# Patient Record
Sex: FEMALE | Race: WHITE | Employment: FULL TIME | URBAN - METROPOLITAN AREA
[De-identification: names, ages, dates, MRNs, and addresses within clinical notes are randomized per-mention and may not be internally consistent; named-entity substitution may affect disease eponyms.]

---

## 2018-01-29 ENCOUNTER — TRANSFERRED RECORDS (OUTPATIENT)
Dept: HEALTH INFORMATION MANAGEMENT | Facility: CLINIC | Age: 23
End: 2018-01-29

## 2018-02-02 ENCOUNTER — OFFICE VISIT (OUTPATIENT)
Dept: DERMATOLOGY | Facility: CLINIC | Age: 23
End: 2018-02-02
Payer: COMMERCIAL

## 2018-02-02 DIAGNOSIS — D22.9 MULTIPLE BENIGN NEVI: Primary | ICD-10-CM

## 2018-02-02 DIAGNOSIS — Z12.83 SKIN CANCER SCREENING: ICD-10-CM

## 2018-02-02 RX ORDER — ALBUTEROL SULFATE 90 UG/1
2 AEROSOL, METERED RESPIRATORY (INHALATION)
COMMUNITY
Start: 2014-05-22

## 2018-02-02 ASSESSMENT — PAIN SCALES - GENERAL: PAINLEVEL: NO PAIN (0)

## 2018-02-02 NOTE — MR AVS SNAPSHOT
After Visit Summary   2018    Gabriela Mcmahon    MRN: [id number]           Patient Information     Date Of Birth          1995        Visit Information        Provider Department      2018 1:45 PM Vicki Urban PA-C [facility]        Today's Diagnoses     Multiple benign nevi    -  1    Skin cancer screening           Follow-ups after your visit        Follow-up notes from your care team     Return in about 1 year (around 2019).      Who to contact     Please call your clinic at [phone] to:    Ask questions about your health    Make or cancel appointments    Discuss your medicines    Learn about your test results    Speak to your doctor   If you have compliments or concerns about an experience at your clinic, or if you wish to file a complaint, please contact [facility] Patient Relations at [phone] or email us at [email or web address]         Additional Information About Your Visit        MyChart Information     Phosphagenicst is an electronic gateway that provides easy, online access to your medical records. With Inkomerce, you can request a clinic appointment, read your test results, renew a prescription or communicate with your care team.     To sign up for Phosphagenicst visit the website at [email or web address]   You will be asked to enter the access code listed below, as well as some personal information. Please follow the directions to create your username and password.     Your access code is: [email or web address]  Expires: 5/3/2018  6:31 AM     Your access code will  in 90 days. If you need help or a new code, please contact your [facility] or call [phone] for assistance.        Care EveryWhere ID     This is your Care EveryWhere ID. This could be used by other organizations to access your [facility] medical records  [id number]         Blood Pressure from Last 3 Encounters:   No data found  for BP    Weight from Last 3 Encounters:   No data found for Wt              Today, you had the following     No orders found for display       Primary Care Provider    None Specified       No primary provider on file.        Equal Access to Services     XANDER SIERRA : Rocio Saini, jane red, porter gregoryesthela denisehai, tyler kevinin hayaan denisemary lou horan duarte rizo. So [facility] [phone].    ATENCIÓN: Si habla español, tiene a thomas disposición servicios gratuitos de asistencia lingüística. Llame al [phone].    We comply with applicable federal civil rights laws and [place] laws. We do not discriminate on the basis of race, color, national origin, age, disability, sex, sexual orientation, or gender identity.            Thank you!     Thank you for choosing [facility] DERMATOLOGY  for your care. Our goal is always to provide you with excellent care. Hearing back from our patients is one way we can continue to improve our services. Please take a few minutes to complete the written survey that you may receive in the mail after your visit with us. Thank you!             Your Updated Medication List - Protect others around you: Learn how to safely use, store and throw away your medicines at www.disposemymeds.org.          This list is accurate as of 2/2/18  2:30 PM.  Always use your most recent med list.                   Brand Name Dispense Instructions for use Diagnosis    albuterol 108 (90 BASE) MCG/ACT Inhaler    PROAIR HFA/PROVENTIL HFA/VENTOLIN HFA     Inhale 2 puffs into the lungs        MIRENA (52 MG) IU

## 2018-02-02 NOTE — NURSING NOTE
Dermatology Rooming Note    Gabriela Mcmahon's goals for this visit include:   Chief Complaint   Patient presents with     Skin Check     Gabriela is here for a skin check- had some lesions removed, notes no areas of concern.      Yana Phoenix MA

## 2018-02-02 NOTE — LETTER
2/2/2018       RE: Gabriela Mcmahon  [address] [place] [facility] [address]     Dear Colleague,    Thank you for referring your patient, Gabriela Mcmahon, to the [facility] DERMATOLOGY at [facility]. Please see a copy of my visit note below.    [facility] Dermatology Note      Dermatology Problem List:  1.Hx moderately dysplastic nevus on back, s/p ED&C with PCP- 1/15/2018    CC:   Chief Complaint   Patient presents with     Skin Check     Gabriela is here for a skin check- had some lesions removed, notes no areas of concern.          Encounter Date: Feb 2, 2018    History of Present Illness:  Ms. Gabriela Mcmahon is a 23 year old female who is new to the dermatology department. She presents for a full skin check. She was recently seen by her PCP who biopsied a nevus on her back which was found to be moderately dysplastic. It was then removed by scraping about 2 weeks ago she says. Her primary also recently biopsied a lesion on her L anterior knee, but the pathology has not come back yet. Her PCP recommended she establish care with dermatology. Gabriela has no specific lesions of concern today. She uses SPF 55 on a regular basis, and states she burns easily. She is otherwise healthy. She reports a distant family hx of skin cancer (grandparents), but is not sure of what type.    She uses an albuterol inhaler prn for exercise induced asthma and has a Mirena IUD.    Past Medical History:   There is no problem list on file for this patient.    History reviewed. No pertinent past medical history.  History reviewed. No pertinent surgical history.    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 Originally from [place]. The patient denies use of tanning beds.    Family History:  There is a distant family history of skin cancer (grandparents), but patient is not sure what  kind.    Medications:  Current Outpatient Prescriptions   Medication Sig Dispense Refill     albuterol (PROAIR HFA/PROVENTIL HFA/VENTOLIN HFA) 108 (90 BASE) MCG/ACT Inhaler Inhale 2 puffs into the lungs       Levonorgestrel (MIRENA, 52 MG, IU)        Allergies   Allergen Reactions     Amoxicillin Rash         Review of Systems:  -As per HPI  -Constitutional: The patient denies fatigue, fevers, chills, unintended weight loss, and night sweats.  -HEENT: Patient denies nonhealing oral sores.  -Skin: As above in HPI. No additional skin concerns.    Physical exam:  Vitals: There were no vitals taken for this visit.  GEN: This is a well developed, well-nourished female in no acute distress, in a pleasant mood.    SKIN: Full skin, which includes the head/face, both arms, chest, back, abdomen, both legs, genitalia and/or groin buttocks, digits and/or nails, was examined.  -Multiple regular brown pigmented macules and papules are identified on the torso, arms and legs.   -There is a 2x2cm erythematous healing plaque on the left mid upper back s/p dysplastic nevi removal by PCP, no sign of infection, purulence, not tender on palpation.   -No other lesions of concern on areas examined.     Impression/Plan:  1. Multiple clinically benign nevi on the trunk, arms and legs.    ABCDs of melanoma were discussed and self skin checks were advised. , Sun precaution was advised including the use of sun screens of SPF 30 or higher, sun protective clothing, and avoidance of tanning beds., We will clinically monitor this area.    I have requested the patient's recent pathology reports from her PCP so that her record can be fully updated as she is transferring care here.    Specifically the most recent biopsy of the R anterior knee as the pathology has not come back yet per patient. Pending path results, we may or may not recommend further treatment to the site.    2. Skin Screening    ABCDs of melanoma were discussed and self skin checks  were advised. Sun precaution was advised including the use of sun screens of SPF 30 or higher, sun protective clothing, and avoidance of tanning beds. We will clinically monitor this area.    CC Dr. Lobo on close of this encounter.    Follow-up in 1 year, or sooner pending pathology report from PCP or new/changing lesions.      Staff Involved:  Staff Only    Vicki Urban PA-C  Department of Dermatology  [facility]  Phone: [phone]

## 2018-02-02 NOTE — PROGRESS NOTES
[facility] Dermatology Note      Dermatology Problem List:  1.Hx moderately dysplastic nevus on back, s/p ED&C with PCP- 1/15/2018    CC:   Chief Complaint   Patient presents with     Skin Check     Gabriela is here for a skin check- had some lesions removed, notes no areas of concern.          Encounter Date: Feb 2, 2018    History of Present Illness:  Ms. Gabriela Mcmahon is a 23 year old female who is new to the dermatology department. She presents for a full skin check. She was recently seen by her PCP who biopsied a nevus on her back which was found to be moderately dysplastic. It was then removed by scraping about 2 weeks ago she says. Her primary also recently biopsied a lesion on her L anterior knee, but the pathology has not come back yet. Her PCP recommended she establish care with dermatology. Gabriela has no specific lesions of concern today. She uses SPF 55 on a regular basis, and states she burns easily. She is otherwise healthy. She reports a distant family hx of skin cancer (grandparents), but is not sure of what type.    She uses an albuterol inhaler prn for exercise induced asthma and has a Mirena IUD.    Past Medical History:   There is no problem list on file for this patient.    History reviewed. No pertinent past medical history.  History reviewed. No pertinent surgical history.    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 Originally from [place]. The patient denies use of tanning beds.    Family History:  There is a distant family history of skin cancer (grandparents), but patient is not sure what kind.    Medications:  Current Outpatient Prescriptions   Medication Sig Dispense Refill     albuterol (PROAIR HFA/PROVENTIL HFA/VENTOLIN HFA) 108 (90 BASE) MCG/ACT Inhaler Inhale 2 puffs into the lungs       Levonorgestrel (MIRENA, 52 MG, IU)        Allergies   Allergen Reactions      Amoxicillin Rash         Review of Systems:  -As per HPI  -Constitutional: The patient denies fatigue, fevers, chills, unintended weight loss, and night sweats.  -HEENT: Patient denies nonhealing oral sores.  -Skin: As above in HPI. No additional skin concerns.    Physical exam:  Vitals: There were no vitals taken for this visit.  GEN: This is a well developed, well-nourished female in no acute distress, in a pleasant mood.    SKIN: Full skin, which includes the head/face, both arms, chest, back, abdomen, both legs, genitalia and/or groin buttocks, digits and/or nails, was examined.  -Multiple regular brown pigmented macules and papules are identified on the torso, arms and legs.   -There is a 2x2cm erythematous healing plaque on the left mid upper back s/p dysplastic nevi removal by PCP, no sign of infection, purulence, not tender on palpation.   -No other lesions of concern on areas examined.     Impression/Plan:  1. Multiple clinically benign nevi on the trunk, arms and legs.    ABCDs of melanoma were discussed and self skin checks were advised. , Sun precaution was advised including the use of sun screens of SPF 30 or higher, sun protective clothing, and avoidance of tanning beds., We will clinically monitor this area.    I have requested the patient's recent pathology reports from her PCP so that her record can be fully updated as she is transferring care here.    Specifically the most recent biopsy of the R anterior knee as the pathology has not come back yet per patient. Pending path results, we may or may not recommend further treatment to the site.    2. Skin Screening    ABCDs of melanoma were discussed and self skin checks were advised. Sun precaution was advised including the use of sun screens of SPF 30 or higher, sun protective clothing, and avoidance of tanning beds. We will clinically monitor this area.    CC Dr. Lobo on close of this encounter.    Follow-up in 1 year, or sooner pending pathology  report from PCP or new/changing lesions.      Staff Involved:  Staff Only    Vicki Urban PA-C  Department of Dermatology  [facility]  Phone: [phone]

## 2018-02-09 ENCOUNTER — APPOINTMENT (OUTPATIENT)
Dept: CT IMAGING | Facility: CLINIC | Age: 23
End: 2018-02-09
Attending: EMERGENCY MEDICINE
Payer: COMMERCIAL

## 2018-02-09 ENCOUNTER — HOSPITAL ENCOUNTER (OUTPATIENT)
Facility: CLINIC | Age: 23
Setting detail: OBSERVATION
Discharge: HOME | End: 2018-02-10
Attending: EMERGENCY MEDICINE | Admitting: EMERGENCY MEDICINE
Payer: COMMERCIAL

## 2018-02-09 ENCOUNTER — NURSE TRIAGE (OUTPATIENT)
Dept: NURSING | Facility: CLINIC | Age: 23
End: 2018-02-09

## 2018-02-09 DIAGNOSIS — R10.84 ABDOMINAL PAIN, GENERALIZED: ICD-10-CM

## 2018-02-09 DIAGNOSIS — R11.2 NON-INTRACTABLE VOMITING WITH NAUSEA, UNSPECIFIED VOMITING TYPE: ICD-10-CM

## 2018-02-09 DIAGNOSIS — N83.8 OVARIAN ENLARGEMENT, RIGHT: ICD-10-CM

## 2018-02-09 LAB
ALBUMIN SERPL-MCNC: 3.9 G/DL (ref 3.4–5)
ALBUMIN UR-MCNC: 10 MG/DL
ALP SERPL-CCNC: 66 U/L (ref 40–150)
ALT SERPL W P-5'-P-CCNC: 12 U/L (ref 0–50)
ANION GAP SERPL CALCULATED.3IONS-SCNC: 6 MMOL/L (ref 3–14)
APPEARANCE UR: ABNORMAL
AST SERPL W P-5'-P-CCNC: 17 U/L (ref 0–45)
BACTERIA #/AREA URNS HPF: ABNORMAL /HPF
BASOPHILS # BLD AUTO: 0 10E9/L (ref 0–0.2)
BASOPHILS NFR BLD AUTO: 0.2 %
BILIRUB SERPL-MCNC: 0.8 MG/DL (ref 0.2–1.3)
BILIRUB UR QL STRIP: NEGATIVE
BUN SERPL-MCNC: 13 MG/DL (ref 7–30)
CALCIUM SERPL-MCNC: 8.8 MG/DL (ref 8.5–10.1)
CHLORIDE SERPL-SCNC: 105 MMOL/L (ref 94–109)
CO2 SERPL-SCNC: 25 MMOL/L (ref 20–32)
COLOR UR AUTO: YELLOW
CREAT BLD-MCNC: 0.8 MG/DL (ref 0.52–1.04)
CREAT SERPL-MCNC: 0.73 MG/DL (ref 0.52–1.04)
DIFFERENTIAL METHOD BLD: ABNORMAL
EOSINOPHIL # BLD AUTO: 0.2 10E9/L (ref 0–0.7)
EOSINOPHIL NFR BLD AUTO: 4.5 %
ERYTHROCYTE [DISTWIDTH] IN BLOOD BY AUTOMATED COUNT: 12 % (ref 10–15)
GFR SERPL CREATININE-BSD FRML MDRD: 90 ML/MIN/1.7M2
GFR SERPL CREATININE-BSD FRML MDRD: >90 ML/MIN/1.7M2
GLUCOSE SERPL-MCNC: 87 MG/DL (ref 70–99)
GLUCOSE UR STRIP-MCNC: NEGATIVE MG/DL
HCG UR QL: NEGATIVE
HCT VFR BLD AUTO: 43.3 % (ref 35–47)
HGB BLD-MCNC: 14.4 G/DL (ref 11.7–15.7)
HGB UR QL STRIP: NEGATIVE
IMM GRANULOCYTES # BLD: 0 10E9/L (ref 0–0.4)
IMM GRANULOCYTES NFR BLD: 0.2 %
INTERNAL QC OK POCT: YES
KETONES UR STRIP-MCNC: 40 MG/DL
LEUKOCYTE ESTERASE UR QL STRIP: NEGATIVE
LIPASE SERPL-CCNC: 132 U/L (ref 73–393)
LYMPHOCYTES # BLD AUTO: 0.9 10E9/L (ref 0.8–5.3)
LYMPHOCYTES NFR BLD AUTO: 21.5 %
MCH RBC QN AUTO: 31.1 PG (ref 26.5–33)
MCHC RBC AUTO-ENTMCNC: 33.3 G/DL (ref 31.5–36.5)
MCV RBC AUTO: 94 FL (ref 78–100)
MONOCYTES # BLD AUTO: 0.4 10E9/L (ref 0–1.3)
MONOCYTES NFR BLD AUTO: 9.1 %
MUCOUS THREADS #/AREA URNS LPF: PRESENT /LPF
NEUTROPHILS # BLD AUTO: 2.7 10E9/L (ref 1.6–8.3)
NEUTROPHILS NFR BLD AUTO: 64.5 %
NITRATE UR QL: NEGATIVE
NRBC # BLD AUTO: 0 10*3/UL
NRBC BLD AUTO-RTO: 0 /100
PH UR STRIP: 5.5 PH (ref 5–7)
PLATELET # BLD AUTO: 147 10E9/L (ref 150–450)
POTASSIUM SERPL-SCNC: 3.4 MMOL/L (ref 3.4–5.3)
PROT SERPL-MCNC: 7.5 G/DL (ref 6.8–8.8)
RBC # BLD AUTO: 4.63 10E12/L (ref 3.8–5.2)
RBC #/AREA URNS AUTO: <1 /HPF (ref 0–2)
SODIUM SERPL-SCNC: 137 MMOL/L (ref 133–144)
SOURCE: ABNORMAL
SP GR UR STRIP: 1.02 (ref 1–1.03)
SQUAMOUS #/AREA URNS AUTO: 7 /HPF (ref 0–1)
TRANS CELLS #/AREA URNS HPF: <1 /HPF (ref 0–1)
UROBILINOGEN UR STRIP-MCNC: NORMAL MG/DL (ref 0–2)
WBC # BLD AUTO: 4.2 10E9/L (ref 4–11)
WBC #/AREA URNS AUTO: 1 /HPF (ref 0–2)

## 2018-02-09 PROCEDURE — 83690 ASSAY OF LIPASE: CPT | Performed by: EMERGENCY MEDICINE

## 2018-02-09 PROCEDURE — 96361 HYDRATE IV INFUSION ADD-ON: CPT | Mod: 59

## 2018-02-09 PROCEDURE — 81025 URINE PREGNANCY TEST: CPT | Performed by: EMERGENCY MEDICINE

## 2018-02-09 PROCEDURE — 25000128 H RX IP 250 OP 636: Performed by: EMERGENCY MEDICINE

## 2018-02-09 PROCEDURE — 99285 EMERGENCY DEPT VISIT HI MDM: CPT | Mod: Z6 | Performed by: EMERGENCY MEDICINE

## 2018-02-09 PROCEDURE — 80053 COMPREHEN METABOLIC PANEL: CPT | Performed by: EMERGENCY MEDICINE

## 2018-02-09 PROCEDURE — 74177 CT ABD & PELVIS W/CONTRAST: CPT

## 2018-02-09 PROCEDURE — 85025 COMPLETE CBC W/AUTO DIFF WBC: CPT | Performed by: EMERGENCY MEDICINE

## 2018-02-09 PROCEDURE — 82565 ASSAY OF CREATININE: CPT

## 2018-02-09 PROCEDURE — 96374 THER/PROPH/DIAG INJ IV PUSH: CPT

## 2018-02-09 PROCEDURE — 81001 URINALYSIS AUTO W/SCOPE: CPT | Performed by: EMERGENCY MEDICINE

## 2018-02-09 PROCEDURE — 99285 EMERGENCY DEPT VISIT HI MDM: CPT | Mod: 25

## 2018-02-09 RX ORDER — IOPAMIDOL 755 MG/ML
77 INJECTION, SOLUTION INTRAVASCULAR ONCE
Status: COMPLETED | OUTPATIENT
Start: 2018-02-09 | End: 2018-02-09

## 2018-02-09 RX ORDER — ONDANSETRON 2 MG/ML
4 INJECTION INTRAMUSCULAR; INTRAVENOUS EVERY 30 MIN PRN
Status: DISCONTINUED | OUTPATIENT
Start: 2018-02-09 | End: 2018-02-10

## 2018-02-09 RX ADMIN — ONDANSETRON 4 MG: 2 INJECTION INTRAMUSCULAR; INTRAVENOUS at 20:49

## 2018-02-09 RX ADMIN — SODIUM CHLORIDE, POTASSIUM CHLORIDE, SODIUM LACTATE AND CALCIUM CHLORIDE 1000 ML: 600; 310; 30; 20 INJECTION, SOLUTION INTRAVENOUS at 20:49

## 2018-02-09 RX ADMIN — IOPAMIDOL 77 ML: 755 INJECTION, SOLUTION INTRAVENOUS at 22:00

## 2018-02-09 ASSESSMENT — ENCOUNTER SYMPTOMS
POLYDIPSIA: 0
BACK PAIN: 0
NECK PAIN: 0
VOMITING: 1
LIGHT-HEADEDNESS: 0
COLOR CHANGE: 0
NECK STIFFNESS: 0
NAUSEA: 1
BRUISES/BLEEDS EASILY: 0
AGITATION: 0
SHORTNESS OF BREATH: 0
CHILLS: 0
FEVER: 0
ABDOMINAL PAIN: 1
DIFFICULTY URINATING: 0
ADENOPATHY: 0

## 2018-02-09 NOTE — IP AVS SNAPSHOT
MRN:[id number]                      After Visit Summary   2/9/2018    Gabriela Mcmahon    MRN: [id number]           Thank you!     Thank you for choosing [facility] for your care. Our goal is always to provide you with excellent care. Hearing back from our patients is one way we can continue to improve our services. Please take a few minutes to complete the written survey that you may receive in the mail after you visit with us. Thank you!        Patient Information     Date Of Birth          1995        Designated Caregiver       Most Recent Value    Caregiver    Will someone help with your care after discharge? yes    Name of designated caregiver Dany     Phone number of caregiver [phone]    Caregiver address [place]       About your hospital stay     You were admitted on:  February 10, 2018 You last received care in the:  [address] [facility]    You were discharged on:  February 10, 2018        Reason for your hospital stay       You were admitted with            Reason for your hospital stay       You were admitted with nausea and vomiting. Your symptoms resolved. You felt better and can discharge to home.     We did a CT which showed your right ovary is larger than the left. OB saw you and did not think this was anything to be concerned about at this time. They recommended to follow-up in 1 month and return to the ED with pain to the right lower abdomen of fevers.                  Who to Call     For medical emergencies, please call 911.  For non-urgent questions about your medical care, please call your primary care provider or clinic, None          Attending Provider     Provider Specialty    Yvette Pacheco MD Emergency Medicine    Taylor Alex MD Emergency Medicine    Joyce, Meena Patiño MD Emergency Medicine       Primary Care Provider Fax #    [facility] [employer] [phone]       When to contact your care team       Return to the ED with fever,  uncontrolled nausea, vomiting, unrelieved pain, bleeding not relieved with pressure, dizziness, chest pain, shortness of breath, loss of consciousness, and any new or concerning symptoms.                  After Care Instructions     Activity       Your activity upon discharge: activity as tolerated and no driving for today            Diet       Follow this diet upon discharge: Orders Placed This Encounter      Advance Diet as Tolerated: Regular Diet Adult                  Follow-up Appointments     Adult [facility] Follow-up and recommended labs and tests       Follow up with primary care provider, [facility], within 7 days for hospital follow- up.  The following labs/tests are recommended: CBC.    OB/GYN within 1 month       Appointments on [place] and/or [facility] (with [facility] provider or service). Call [phone] if you haven't heard regarding these appointments within 7 days of discharge.                  Additional Services     OB/GYN REFERRAL       Your provider has referred you to:  PREFERRED PROVIDERS:    Please be aware that coverage of these services is subject to the terms and limitations of your health insurance plan.  Call member services at your health plan with any benefit or coverage questions.      Please bring the following with you to your appointment:    (1) Any X-Rays, CTs or MRIs which have been performed.  Contact the facility where they were done to arrange for  prior to your scheduled appointment.   (2) List of current medications   (3) This referral request   (4) Any documents/labs given to you for this referral                  Pending Results     No orders found for last 3 day(s).            Statement of Approval     Ordered          02/10/18 1608  I have reviewed and agree with all the recommendations and orders detailed in this document.  EFFECTIVE NOW     Approved and electronically signed by:  Maura Resendez APRN CNP             Admission  "Information     Date & Time Provider Department Dept. Phone    2018 Meena Cook MD [address] [facility] [place] [phone]      Your Vitals Were     Blood Pressure Pulse Temperature Respirations Height Weight    97/58 (BP Location: Left arm) 69 98.4  F (36.9  C) (Oral) 18 1.626 m (5' 4\") 56.6 kg (124 lb 12.8 oz)    Pulse Oximetry BMI (Body Mass Index)                98% 21.42 kg/m2          [employer] Information     EasyLink lets you send messages to your doctor, view your test results, renew your prescriptions, schedule appointments and more. To sign up, go to [email or web address] . Click on \"Log in\" on the left side of the screen, which will take you to the Welcome page. Then click on \"Sign up Now\" on the right side of the page.     You will be asked to enter the access code listed below, as well as some personal information. Please follow the directions to create your username and password.     Your access code is: [email or web address]  Expires: 5/3/2018  6:31 AM     Your access code will  in 90 days. If you need help or a new code, please call your [facility] clinic or [phone].        Care EveryWhere ID     This is your Care EveryWhere ID. This could be used by other organizations to access your [facility] medical records  [id number]        Equal Access to Services     XANDER SIERRA AH: Hadii efra Saini, waaxda luqadaha, qaybta kaalmada crisyadelvis, tyler rizo. So [facility] [phone].    ATENCIÓN: Si habla español, tiene a thomas disposición servicios gratuitos de asistencia lingüística. Llame al [phone].    We comply with applicable federal civil rights laws and [place] laws. We do not discriminate on the basis of race, color, national origin, age, disability, sex, sexual orientation, or gender identity.               Review of your medicines      START taking        Dose / Directions    ondansetron 4 MG ODT tab   Commonly known as:  ZOFRAN-ODT   Used for:  " Non-intractable vomiting with nausea, unspecified vomiting type        Dose:  4 mg   Take 1 tablet (4 mg) by mouth every 6 hours as needed for nausea or vomiting   Quantity:  10 tablet   Refills:  0         CONTINUE these medicines which have NOT CHANGED        Dose / Directions    albuterol 108 (90 BASE) MCG/ACT Inhaler   Commonly known as:  PROAIR HFA/PROVENTIL HFA/VENTOLIN HFA        Dose:  2 puff   Inhale 2 puffs into the lungs   Refills:  0       MIRENA (52 MG) IU        Refills:  0            Where to get your medicines      These medications were sent to [facility] - [place], [place] - [address]  [address] [address]     Phone:  [phone]     ondansetron 4 MG ODT tab                Protect others around you: Learn how to safely use, store and throw away your medicines at www.disposemymeds.org.             Medication List: This is a list of all your medications and when to take them. Check marks below indicate your daily home schedule. Keep this list as a reference.      Medications           Morning Afternoon Evening Bedtime As Needed    albuterol 108 (90 BASE) MCG/ACT Inhaler   Commonly known as:  PROAIR HFA/PROVENTIL HFA/VENTOLIN HFA   Inhale 2 puffs into the lungs                                MIRENA (52 MG) IU                                ondansetron 4 MG ODT tab   Commonly known as:  ZOFRAN-ODT   Take 1 tablet (4 mg) by mouth every 6 hours as needed for nausea or vomiting                                          More Information        Self-Care for Vomiting and Diarrhea    Vomiting and diarrhea can make you miserable. Your stomach and bowels are reacting to an irritant. This might be food, medicine, or viral stomach flu. Vomiting and diarrhea are two ways your body can remove the problem from your system. Nausea is a symptom that discourages you from eating. This gives your stomach and bowels time to recover. To get back to normal, start with  self-care to ease your discomfort.  Drink liquids  Drink or sip liquids to avoid losing too much fluid (dehydration):    Clear liquids such as water or broth are the best choices.    Do not drink beverages with a lot of sugar in them, such as juices and sodas. These can make diarrhea worse.    If you have severe vomiting, do not drink sport drinks, such as electrolyte solutions. These don't have the right mix of water, sugar, and minerals. They can also make the symptoms worse. In this situation, commercially available oral rehydration solutions are best.     Suck on ice chips if the thought of drinking something makes you queasy.  When you re able to eat again  Tips include the following:    As your appetite comes back, you can resume your normal diet.    Ask your healthcare provider whether you should stay away from any foods.  Medicines  Know the following about medicines:    Vomiting and diarrhea are ways your body uses to rid itself of harmful substances such as bacteria. DO NOT use antidiarrheal or antivomiting (antiemetic) medicines unless your healthcare provider tells you to do so.    Aspirin, medicine with aspirin, and many aspirin substitutes can irritate your stomach. So avoid them when you have stomach upset.    Certain prescription and over-the-counter medicines can cause vomiting and diarrhea. Talk with your healthcare provider about any medicines you take that may be causing these symptoms.    Certain over-the-counter antihistamines can help control nausea. Other medicines can help soothe stomach upset. Ask your healthcare provider which medicines may help you.  When to call your healthcare provider  Call your healthcare provider if you have:    Bloody or black vomit or stools    Severe, steady belly pain    Vomiting with a severe headache or vomiting after a head injury    Vomiting and diarrhea together for more than an hour    An inability to hold down even sips of liquids for more than  12 hours    Vomiting that lasts more than 24 hours    Severe diarrhea that lasts more than 2 days    Yellowish color to your skin or the whites of your eyes    Inability to urinate. In infants and young children, not making wet diapers.    Date Last Reviewed: 6/1/2016 2000-2017 [employer]. [address] [place] [address], [place] [address]. All rights reserved. This information is not intended as a substitute for professional medical care. Always follow your healthcare professional's instructions.

## 2018-02-09 NOTE — IP AVS SNAPSHOT
[address] [address]    Phone:  [phone]                                       After Visit Summary   2/9/2018    Gabriela Mcmahon    MRN: [id number]           After Visit Summary Signature Page     I have received my discharge instructions, and my questions have been answered. I have discussed any challenges I see with this plan with the nurse or doctor.    ..........................................................................................................................................  Patient/Patient Representative Signature      ..........................................................................................................................................  Patient Representative Print Name and Relationship to Patient    ..................................................               ................................................  Date                                            Time    ..........................................................................................................................................  Reviewed by Signature/Title    ...................................................              ..............................................  Date                                                            Time

## 2018-02-10 ENCOUNTER — APPOINTMENT (OUTPATIENT)
Dept: ULTRASOUND IMAGING | Facility: CLINIC | Age: 23
End: 2018-02-10
Attending: EMERGENCY MEDICINE
Payer: COMMERCIAL

## 2018-02-10 VITALS
DIASTOLIC BLOOD PRESSURE: 58 MMHG | OXYGEN SATURATION: 98 % | WEIGHT: 124.8 LBS | BODY MASS INDEX: 21.31 KG/M2 | RESPIRATION RATE: 18 BRPM | HEIGHT: 64 IN | SYSTOLIC BLOOD PRESSURE: 97 MMHG | HEART RATE: 69 BPM | TEMPERATURE: 98.4 F

## 2018-02-10 PROBLEM — R11.2 NAUSEA VOMITING AND DIARRHEA: Status: ACTIVE | Noted: 2018-02-10

## 2018-02-10 PROBLEM — R19.7 NAUSEA VOMITING AND DIARRHEA: Status: ACTIVE | Noted: 2018-02-10

## 2018-02-10 LAB
ANION GAP SERPL CALCULATED.3IONS-SCNC: 11 MMOL/L (ref 3–14)
BASOPHILS # BLD AUTO: 0 10E9/L (ref 0–0.2)
BASOPHILS NFR BLD AUTO: 0.3 %
BUN SERPL-MCNC: 12 MG/DL (ref 7–30)
CALCIUM SERPL-MCNC: 8.3 MG/DL (ref 8.5–10.1)
CHLORIDE SERPL-SCNC: 109 MMOL/L (ref 94–109)
CO2 SERPL-SCNC: 20 MMOL/L (ref 20–32)
CREAT SERPL-MCNC: 0.72 MG/DL (ref 0.52–1.04)
DIFFERENTIAL METHOD BLD: ABNORMAL
EOSINOPHIL # BLD AUTO: 0.2 10E9/L (ref 0–0.7)
EOSINOPHIL NFR BLD AUTO: 6.6 %
ERYTHROCYTE [DISTWIDTH] IN BLOOD BY AUTOMATED COUNT: 12 % (ref 10–15)
GFR SERPL CREATININE-BSD FRML MDRD: >90 ML/MIN/1.7M2
GLUCOSE BLDC GLUCOMTR-MCNC: 186 MG/DL (ref 70–99)
GLUCOSE BLDC GLUCOMTR-MCNC: 54 MG/DL (ref 70–99)
GLUCOSE BLDC GLUCOMTR-MCNC: 93 MG/DL (ref 70–99)
GLUCOSE BLDC GLUCOMTR-MCNC: 99 MG/DL (ref 70–99)
GLUCOSE SERPL-MCNC: 54 MG/DL (ref 70–99)
HCT VFR BLD AUTO: 38.8 % (ref 35–47)
HGB BLD-MCNC: 12.7 G/DL (ref 11.7–15.7)
IMM GRANULOCYTES # BLD: 0 10E9/L (ref 0–0.4)
IMM GRANULOCYTES NFR BLD: 0.3 %
LACTATE BLD-SCNC: 0.5 MMOL/L (ref 0.7–2)
LYMPHOCYTES # BLD AUTO: 1.4 10E9/L (ref 0.8–5.3)
LYMPHOCYTES NFR BLD AUTO: 41.3 %
MAGNESIUM SERPL-MCNC: 1.8 MG/DL (ref 1.6–2.3)
MCH RBC QN AUTO: 30.5 PG (ref 26.5–33)
MCHC RBC AUTO-ENTMCNC: 32.7 G/DL (ref 31.5–36.5)
MCV RBC AUTO: 93 FL (ref 78–100)
MONOCYTES # BLD AUTO: 0.4 10E9/L (ref 0–1.3)
MONOCYTES NFR BLD AUTO: 11.8 %
NEUTROPHILS # BLD AUTO: 1.4 10E9/L (ref 1.6–8.3)
NEUTROPHILS NFR BLD AUTO: 39.7 %
NRBC # BLD AUTO: 0 10*3/UL
NRBC BLD AUTO-RTO: 0 /100
PHOSPHATE SERPL-MCNC: 3 MG/DL (ref 2.5–4.5)
PLATELET # BLD AUTO: 145 10E9/L (ref 150–450)
POTASSIUM SERPL-SCNC: 3.5 MMOL/L (ref 3.4–5.3)
PROCALCITONIN SERPL-MCNC: 0.09 NG/ML
RBC # BLD AUTO: 4.16 10E12/L (ref 3.8–5.2)
SODIUM SERPL-SCNC: 140 MMOL/L (ref 133–144)
WBC # BLD AUTO: 3.5 10E9/L (ref 4–11)

## 2018-02-10 PROCEDURE — 85025 COMPLETE CBC W/AUTO DIFF WBC: CPT | Performed by: EMERGENCY MEDICINE

## 2018-02-10 PROCEDURE — G0378 HOSPITAL OBSERVATION PER HR: HCPCS

## 2018-02-10 PROCEDURE — 80048 BASIC METABOLIC PNL TOTAL CA: CPT | Performed by: PHYSICIAN ASSISTANT

## 2018-02-10 PROCEDURE — 83605 ASSAY OF LACTIC ACID: CPT | Performed by: PHYSICIAN ASSISTANT

## 2018-02-10 PROCEDURE — 99235 HOSP IP/OBS SAME DATE MOD 70: CPT | Mod: Z6 | Performed by: EMERGENCY MEDICINE

## 2018-02-10 PROCEDURE — 84100 ASSAY OF PHOSPHORUS: CPT | Performed by: PHYSICIAN ASSISTANT

## 2018-02-10 PROCEDURE — 93976 VASCULAR STUDY: CPT

## 2018-02-10 PROCEDURE — 25000128 H RX IP 250 OP 636: Performed by: EMERGENCY MEDICINE

## 2018-02-10 PROCEDURE — 25000128 H RX IP 250 OP 636: Performed by: PHYSICIAN ASSISTANT

## 2018-02-10 PROCEDURE — 25800025 ZZH RX 258: Performed by: PHYSICIAN ASSISTANT

## 2018-02-10 PROCEDURE — 83735 ASSAY OF MAGNESIUM: CPT | Performed by: PHYSICIAN ASSISTANT

## 2018-02-10 PROCEDURE — 96375 TX/PRO/DX INJ NEW DRUG ADDON: CPT

## 2018-02-10 PROCEDURE — 25800025 ZZH RX 258

## 2018-02-10 PROCEDURE — 25000125 ZZHC RX 250: Performed by: PHYSICIAN ASSISTANT

## 2018-02-10 PROCEDURE — 25000132 ZZH RX MED GY IP 250 OP 250 PS 637: Performed by: PHYSICIAN ASSISTANT

## 2018-02-10 PROCEDURE — 84145 PROCALCITONIN (PCT): CPT | Performed by: PHYSICIAN ASSISTANT

## 2018-02-10 PROCEDURE — 36415 COLL VENOUS BLD VENIPUNCTURE: CPT | Performed by: PHYSICIAN ASSISTANT

## 2018-02-10 PROCEDURE — 00000146 ZZHCL STATISTIC GLUCOSE BY METER IP

## 2018-02-10 RX ORDER — NALOXONE HYDROCHLORIDE 0.4 MG/ML
.1-.4 INJECTION, SOLUTION INTRAMUSCULAR; INTRAVENOUS; SUBCUTANEOUS
Status: DISCONTINUED | OUTPATIENT
Start: 2018-02-10 | End: 2018-02-10 | Stop reason: HOSPADM

## 2018-02-10 RX ORDER — NICOTINE POLACRILEX 4 MG
15-30 LOZENGE BUCCAL
Status: DISCONTINUED | OUTPATIENT
Start: 2018-02-10 | End: 2018-02-10 | Stop reason: HOSPADM

## 2018-02-10 RX ORDER — ONDANSETRON 2 MG/ML
4 INJECTION INTRAMUSCULAR; INTRAVENOUS EVERY 6 HOURS PRN
Status: DISCONTINUED | OUTPATIENT
Start: 2018-02-10 | End: 2018-02-10 | Stop reason: HOSPADM

## 2018-02-10 RX ORDER — DEXTROSE MONOHYDRATE 25 G/50ML
25-50 INJECTION, SOLUTION INTRAVENOUS
Status: DISCONTINUED | OUTPATIENT
Start: 2018-02-10 | End: 2018-02-10 | Stop reason: HOSPADM

## 2018-02-10 RX ORDER — ACETAMINOPHEN 500 MG
1000 TABLET ORAL EVERY 6 HOURS PRN
Status: DISCONTINUED | OUTPATIENT
Start: 2018-02-10 | End: 2018-02-10 | Stop reason: HOSPADM

## 2018-02-10 RX ORDER — LIDOCAINE 40 MG/G
CREAM TOPICAL
Status: DISCONTINUED | OUTPATIENT
Start: 2018-02-10 | End: 2018-02-10 | Stop reason: HOSPADM

## 2018-02-10 RX ORDER — ONDANSETRON 4 MG/1
4 TABLET, ORALLY DISINTEGRATING ORAL EVERY 6 HOURS PRN
Status: DISCONTINUED | OUTPATIENT
Start: 2018-02-10 | End: 2018-02-10 | Stop reason: HOSPADM

## 2018-02-10 RX ORDER — METOCLOPRAMIDE HYDROCHLORIDE 5 MG/ML
10 INJECTION INTRAMUSCULAR; INTRAVENOUS EVERY 6 HOURS PRN
Status: DISCONTINUED | OUTPATIENT
Start: 2018-02-10 | End: 2018-02-10 | Stop reason: HOSPADM

## 2018-02-10 RX ORDER — SODIUM CHLORIDE, SODIUM LACTATE, POTASSIUM CHLORIDE, CALCIUM CHLORIDE 600; 310; 30; 20 MG/100ML; MG/100ML; MG/100ML; MG/100ML
INJECTION, SOLUTION INTRAVENOUS CONTINUOUS
Status: DISCONTINUED | OUTPATIENT
Start: 2018-02-10 | End: 2018-02-10

## 2018-02-10 RX ORDER — PROCHLORPERAZINE MALEATE 5 MG
10 TABLET ORAL EVERY 6 HOURS PRN
Status: DISCONTINUED | OUTPATIENT
Start: 2018-02-10 | End: 2018-02-10 | Stop reason: HOSPADM

## 2018-02-10 RX ORDER — DEXTROSE MONOHYDRATE, SODIUM CHLORIDE, AND POTASSIUM CHLORIDE 50; 1.49; 9 G/1000ML; G/1000ML; G/1000ML
INJECTION, SOLUTION INTRAVENOUS CONTINUOUS
Status: DISCONTINUED | OUTPATIENT
Start: 2018-02-10 | End: 2018-02-10 | Stop reason: HOSPADM

## 2018-02-10 RX ORDER — DEXTROSE MONOHYDRATE 25 G/50ML
INJECTION, SOLUTION INTRAVENOUS
Status: COMPLETED
Start: 2018-02-10 | End: 2018-02-10

## 2018-02-10 RX ORDER — PROCHLORPERAZINE 25 MG
25 SUPPOSITORY, RECTAL RECTAL EVERY 12 HOURS PRN
Status: DISCONTINUED | OUTPATIENT
Start: 2018-02-10 | End: 2018-02-10 | Stop reason: HOSPADM

## 2018-02-10 RX ORDER — ONDANSETRON 4 MG/1
4 TABLET, ORALLY DISINTEGRATING ORAL EVERY 6 HOURS PRN
Qty: 10 TABLET | Refills: 0 | Status: SHIPPED | OUTPATIENT
Start: 2018-02-10 | End: 2021-03-01

## 2018-02-10 RX ORDER — METOCLOPRAMIDE 10 MG/1
10 TABLET ORAL EVERY 6 HOURS PRN
Status: DISCONTINUED | OUTPATIENT
Start: 2018-02-10 | End: 2018-02-10 | Stop reason: HOSPADM

## 2018-02-10 RX ADMIN — SODIUM CHLORIDE 1000 ML: 9 INJECTION, SOLUTION INTRAVENOUS at 06:01

## 2018-02-10 RX ADMIN — PANTOPRAZOLE SODIUM 40 MG: 40 INJECTION, POWDER, FOR SOLUTION INTRAVENOUS at 10:38

## 2018-02-10 RX ADMIN — SODIUM CHLORIDE, POTASSIUM CHLORIDE, SODIUM LACTATE AND CALCIUM CHLORIDE: 600; 310; 30; 20 INJECTION, SOLUTION INTRAVENOUS at 07:08

## 2018-02-10 RX ADMIN — DEXTROSE MONOHYDRATE 25 ML: 25 INJECTION, SOLUTION INTRAVENOUS at 07:58

## 2018-02-10 RX ADMIN — SODIUM CHLORIDE, POTASSIUM CHLORIDE, SODIUM LACTATE AND CALCIUM CHLORIDE: 600; 310; 30; 20 INJECTION, SOLUTION INTRAVENOUS at 04:41

## 2018-02-10 RX ADMIN — ONDANSETRON 4 MG: 2 INJECTION INTRAMUSCULAR; INTRAVENOUS at 05:17

## 2018-02-10 RX ADMIN — ACETAMINOPHEN 1000 MG: 500 TABLET, FILM COATED ORAL at 13:02

## 2018-02-10 RX ADMIN — POTASSIUM CHLORIDE, DEXTROSE MONOHYDRATE AND SODIUM CHLORIDE: 150; 5; 900 INJECTION, SOLUTION INTRAVENOUS at 09:16

## 2018-02-10 NOTE — H&P
"[facility] ED Observation Admission Note    Chief Complaint   Patient presents with     Abdominal Pain     Nausea & Vomiting       Assessment/Plan:  1. Abdominal Pain, Nausea, Vomiting: ~1 day of symptoms. 4 non bloody emesis. 2 non bloody stools. Last episodes yesterday. Reports feeling lightheaded, tachycardia, decreased UOP, subjective fever, chills, myalgias. Denies any abdominal surgeries, known sick contacts, recent antibiotic use, recent travel, STDs, URI symptoms, dysuria, abnormal vaginal discharge. In the ED, , 's-130's/60's-90's, RR 18, SaO2 96-99% on RA, Temp 97.7. Labs show normal CMP, lipase, CBC. Bhcg is negative. UA with 40 ketones, 10 protein. CT abdomen pelvis reports: \"1. Appendix is not visualized, but the terminal ileum is located and there is no adjacent pericecal fat stranding or bowel wall thickening to indicate local edema. 2. Slightly asymmetric size of the right ovary, with slightly different orientation relative to the left. Normal enhancement. Recommend pelvic ultrasound if clinically indicated\". Pelvic ultrasound reports: \"The right ovary volume is 5 times that of the left, but still less than 20 cm. Normal blood flow is present. Findings are not diagnostic of torsion, although an early torsion cannot be excluded.\" GYN was consulted in the ED with impression that right ovarian enlargement was nonspecific with overall normal size.  In the ED the patient was given 1L LR bolus and zofran 4mg IV x 1. She continued to feel nauseous and is being admitted to the Observation Unit for supportive care. Lips and oral mucosa dry. Heart rate borderline tachycardia.   - NS bolus 1L now  - MIVF with NS at 125ml/hr  - Zofran, Compazine prn nausea  - Tylenol prn fever or pain  - Protonix 40mg IV BID  - ADAT to liquid diet  - Send stool culture and C-Diff  - Enteric isolation  - Send BMP, lactic acid, procalcitonin this AM        HPI:    Gabriela Mcmahon is a 23 year old female with a history of " "sports induced asthma who presented to the ED with abdominal pain, nausea, vomiting and diarrhea. She had some soup from [employer] at about 7pm on Thursday evening, 2 days ago. However instantly she started feeling sick to her stomach as well as abdominal pain. Then around 12am started having non bloody vomiting and non bloody diarrhea. Overall she reports 4 episodes of vomiting with last episode about 7pm last night and 2 episodes of diarrhea last episode yesterday morning. Abdominal pain is in upper quadrants described as cramps and ache ~ 4/10.  Admits to feeling lightheaded, tachycardia, decreased UOP, subjective fever, chills, myalgias. Denies any abdominal surgeries, known sick contacts, recent antibiotic use, recent travel, STDs, URI symptoms. Took some ibuprofen at home. Denies any dysuria, hematuria, abnormal vaginal discharge.     In the ED, , 's-130's/60's-90's, RR 18, SaO2 96-99% on RA, Temp 97.7. Labs show normal CMP, lipase, CBC. Bhcg is negative. UA with 40 ketones, 10 protein. CT abdomen pelvis reports: \"1. Appendix is not visualized, but the terminal ileum is located and there is no adjacent pericecal fat stranding or bowel wall thickening to indicate local edema. 2. Slightly asymmetric size of the right ovary, with slightly different orientation relative to the left. Normal enhancement. Recommend pelvic ultrasound if clinically indicated\". Pelvic ultrasound reports: \"The right ovary volume is 5 times that of the left, but still less than 20 cm. Normal blood flow is present. Findings are not diagnostic of torsion, although an early torsion cannot be excluded.\" GYN was consulted in the ED with impression that right ovarian enlargement was nonspecific with overall normal size.  In the ED the patient was given 1L LR bolus and zofran 4mg IV x 1. She continues to feel nauseous and is being admitted for supportive care.     On admission to the observation unit the patient was continuing to complain " of nausea.     History:    Past Medical History:   Diagnosis Date     Asthma        Past Surgical History:   Procedure Laterality Date     BIOPSY OF SKIN LESION       HC TOOTH EXTRACTION W/FORCEP         Family History   Problem Relation Age of Onset     Skin Cancer Maternal Grandmother      Skin Cancer Paternal Grandmother      Heart Defect Father      CEREBROVASCULAR DISEASE Father      Melanoma No family hx of        Social History     Social History     Marital status: Single     Spouse name: N/A     Number of children: N/A     Years of education: N/A     Occupational History     Not on file.     Social History Main Topics     Smoking status: Never Smoker     Smokeless tobacco: Never Used     Alcohol use Yes      Comment: occasional     Drug use: Yes     Special: Marijuana     Sexual activity: Not on file     Other Topics Concern     Not on file     Social History Narrative         No current facility-administered medications on file prior to encounter.   Current Outpatient Prescriptions on File Prior to Encounter:  albuterol (PROAIR HFA/PROVENTIL HFA/VENTOLIN HFA) 108 (90 BASE) MCG/ACT Inhaler Inhale 2 puffs into the lungs   Levonorgestrel (MIRENA, 52 MG, IU)        Data:    Results for orders placed or performed during the hospital encounter of 02/09/18   CT Abdomen Pelvis w Contrast    Narrative    Preliminary     Impression    impression:  1. Appendix is not visualized, but the terminal ileum is located and  there is no adjacent pericecal fat stranding or bowel wall thickening  to indicate local edema.  2. Slightly asymmetric size of the right ovary, with slightly  different orientation relative to the left. Normal enhancement.  Recommend pelvic ultrasound if clinically indicated.   US Pelvic Complete w Transvaginal & Abd/Pel Duplex Limited    Narrative    Preliminary     Impression    impression:  The right ovary volume is 5 times that of the left, but still less  than 20 cm3. Normal blood flow is present.  Findings are not diagnostic  of torsion, although an early torsion cannot be excluded.    Findings of asymmetric ovary size were discussed with Dr. Pacheco by  telephone at 2/10/2018 12:34 AM by Dr. Chung Castellanos.   CBC with platelets differential   Result Value Ref Range    WBC 4.2 4.0 - 11.0 10e9/L    RBC Count 4.63 3.8 - 5.2 10e12/L    Hemoglobin 14.4 11.7 - 15.7 g/dL    Hematocrit 43.3 35.0 - 47.0 %    MCV 94 78 - 100 fl    MCH 31.1 26.5 - 33.0 pg    MCHC 33.3 31.5 - 36.5 g/dL    RDW 12.0 10.0 - 15.0 %    Platelet Count 147 (L) 150 - 450 10e9/L    Diff Method Automated Method     % Neutrophils 64.5 %    % Lymphocytes 21.5 %    % Monocytes 9.1 %    % Eosinophils 4.5 %    % Basophils 0.2 %    % Immature Granulocytes 0.2 %    Nucleated RBCs 0 0 /100    Absolute Neutrophil 2.7 1.6 - 8.3 10e9/L    Absolute Lymphocytes 0.9 0.8 - 5.3 10e9/L    Absolute Monocytes 0.4 0.0 - 1.3 10e9/L    Absolute Eosinophils 0.2 0.0 - 0.7 10e9/L    Absolute Basophils 0.0 0.0 - 0.2 10e9/L    Abs Immature Granulocytes 0.0 0 - 0.4 10e9/L    Absolute Nucleated RBC 0.0    Comprehensive metabolic panel   Result Value Ref Range    Sodium 137 133 - 144 mmol/L    Potassium 3.4 3.4 - 5.3 mmol/L    Chloride 105 94 - 109 mmol/L    Carbon Dioxide 25 20 - 32 mmol/L    Anion Gap 6 3 - 14 mmol/L    Glucose 87 70 - 99 mg/dL    Urea Nitrogen 13 7 - 30 mg/dL    Creatinine 0.73 0.52 - 1.04 mg/dL    GFR Estimate >90 >60 mL/min/1.7m2    GFR Estimate If Black >90 >60 mL/min/1.7m2    Calcium 8.8 8.5 - 10.1 mg/dL    Bilirubin Total 0.8 0.2 - 1.3 mg/dL    Albumin 3.9 3.4 - 5.0 g/dL    Protein Total 7.5 6.8 - 8.8 g/dL    Alkaline Phosphatase 66 40 - 150 U/L    ALT 12 0 - 50 U/L    AST 17 0 - 45 U/L   Lipase   Result Value Ref Range    Lipase 132 73 - 393 U/L   UA with Microscopic   Result Value Ref Range    Color Urine Yellow     Appearance Urine Slightly Cloudy     Glucose Urine Negative NEG^Negative mg/dL    Bilirubin Urine Negative NEG^Negative    Ketones Urine 40  (A) NEG^Negative mg/dL    Specific Gravity Urine 1.018 1.003 - 1.035    Blood Urine Negative NEG^Negative    pH Urine 5.5 5.0 - 7.0 pH    Protein Albumin Urine 10 (A) NEG^Negative mg/dL    Urobilinogen mg/dL Normal 0.0 - 2.0 mg/dL    Nitrite Urine Negative NEG^Negative    Leukocyte Esterase Urine Negative NEG^Negative    Source Midstream Urine     WBC Urine 1 0 - 2 /HPF    RBC Urine <1 0 - 2 /HPF    Bacteria Urine Few (A) NEG^Negative /HPF    Squamous Epithelial /HPF Urine 7 (H) 0 - 1 /HPF    Transitional Epi <1 0 - 1 /HPF    Mucous Urine Present (A) NEG^Negative /LPF   Creatinine POCT   Result Value Ref Range    Creatinine 0.8 0.52 - 1.04 mg/dL    GFR Estimate 90 >60 mL/min/1.7m2    GFR Estimate If Black >90 >60 mL/min/1.7m2   hCG qual urine POCT   Result Value Ref Range    HCG Qual Urine Negative neg    Internal QC OK Yes         ROS:    Review Of Systems  Skin: negative  Eyes: negative  Ears/Nose/Throat: negative  Respiratory: No shortness of breath, dyspnea on exertion, cough, or hemoptysis  Cardiovascular: tachycardia  Gastrointestinal: positive for poor appetite, nausea, vomiting, abdominal pain and diarrhea, negative for, heartburn, hematemesis, melena and hematochezia  Genitourinary: negative  Musculoskeletal: myalgias  Neurologic: lightheadedness  Psychiatric: negative  Hematologic/Lymphatic/Immunologic: chills and fever  Endocrine: negative  PCP: Tyrel    10 point ROS negative other than the symptoms noted above.      Exam:  Vitals:  B/P: 104/54, T: 97.9, P: 71, R: 18    Constitutional: healthy, alert, no distress and cooperative  Head: Normocephalic. No masses, lesions, tenderness or abnormalities  Neck: Neck supple. No adenopathy. Thyroid symmetric, normal size,, Carotids without bruits.  ENT: dry oral mucosa and lips  Cardiovascular: negative, PMI normal. No lifts, heaves, or thrills. Borderline (HR ~ 90's). No murmurs, clicks gallops or rub  Respiratory: negative, Percussion normal. Good  diaphragmatic excursion. Lungs clear  Gastrointestinal: Abdomen soft, epigastric and RUQ and suprapubic tenderness. BS normal. No masses, organomegaly  : Deferred  Musculoskeletal: extremities normal- no gross deformities noted, gait normal and normal muscle tone  Skin: no suspicious lesions or rashes  Neurologic: Gait normal. Reflexes normal and symmetric. Sensation grossly WNL.  Psychiatric: mentation appears normal and affect normal/bright  Hematologic/Lymphatic/Immunologic: normal ant/post cervical, axillary, supraclavicular and inguinal nodes    Consults: none  FEN: ADAT to liquid diet; MIVF with LR at 125ml/hr   DVT prophylaxis: encourage ambulation; expect short stay  GI prophylaxis: protonix  BM prophylaxis: none  Code Status: full code  Disposition: home once tolerating po, improved symptoms, stable vitals    Signed:  Ivan Garcia PA-C   February 10, 2018 at 5:22 AM

## 2018-02-10 NOTE — CONSULTS
[facility] History and Physical  Gynecology Consult     Gabriela Mcmahon MRN# [id number]   Age: 23 year old YOB: 1995     Date of Admission: 2/9/2018    Primary care provider: [facility]    CC:  Gabriela Mcmahon is a 23 year old who presents for abdominal pain and vomiting.    HPI: She reports the nausea and abdominal pain started last night at 7pm and she vomited at 7pm, midnight, 3am 7am and then again 7pm tonight. Diffuse crampy and dull abdominal pain started at the same time, rates 4/10 constant with few minutes of 8/10 pain every so often. Reports feeling feverish with temp of 99F taken at home, loose stools x2 today, and lightheadedness as unable to eat/rink most of today. Denies sick contacts, dysuria, vaginal discharge, vaginal bleeding, itching/burning, chest pain, SOB, congestion, passing out, leg pain or swelling.    ROS: 10-pt ROS negative as above    Gyn Hx:  Menses: ammenorrhea w/ Mirena IUD - prior to that regulated via OCPs and irregular without hormonal manipulation with +pain, [id number] lasting 8 days  Sexual hx: 1 male partner, vaginal and oral intercourse  Contraception: Mirena IUD  H/o STDs: Denies  Paps: Reports NIL 11/2017    OB: G0    PMH: Asthma, exercise induced    PSHx: tonsils (age 10), wisdom teeth    Medications: Mirena IUD, albuterol PRN    No current facility-administered medications on file prior to encounter.   Current Outpatient Prescriptions on File Prior to Encounter:  albuterol (PROAIR HFA/PROVENTIL HFA/VENTOLIN HFA) 108 (90 BASE) MCG/ACT Inhaler Inhale 2 puffs into the lungs   Levonorgestrel (MIRENA, 52 MG, IU)      Allergies:   Allergies   Allergen Reactions     Amoxicillin Rash     Social History:  Occupational History     Works part time, goes to school part time     Social History Main Topics     Smoking status: Never Smoker     Smokeless tobacco: Never Used     Alcohol use 2-3 drinks per week     Drug use: Recreational marijuana 2x/wk  "    Physical Exam:   Vitals:    02/09/18 2007 02/09/18 2155   BP: 136/90    Pulse: 103    Resp: 18    Temp: 97.7  F (36.5  C)    TempSrc: Oral    SpO2: 99%    Weight:  56.7 kg (125 lb)   Height: 1.626 m (5' 4\")       Gen: Lying in bed, NAD  CV: rrr, nl s1 and s2, no m/r/g  Lungs: CTAB, no wheezes or crackles  Abdomen: soft, mildly tender to palpation in midline upper and lower abd w/ voluntary guarding on deep palpation but nontender in lateral quadrants. No rebound tenderness  Extremities: non-tender BLEs  Pelvic: No cervical motion or bilateral adnexal tenderness    Labs/Imaging:  Results for orders placed or performed during the hospital encounter of 02/09/18   CT Abdomen Pelvis w Contrast    Narrative    Preliminary     Impression    impression:  1. Appendix is not visualized, but the terminal ileum is located and  there is no adjacent pericecal fat stranding or bowel wall thickening  to indicate local edema.  2. Slightly asymmetric size of the right ovary, with slightly  different orientation relative to the left. Normal enhancement.  Recommend pelvic ultrasound if clinically indicated.   US Pelvic Complete w Transvaginal & Abd/Pel Duplex Limited    Narrative    Preliminary     Impression    impression:  The right ovary volume is 5 times that of the left, but still less  than 20 cm3. Normal blood flow is present. Findings are not diagnostic  of torsion, although an early torsion cannot be excluded.    Findings of asymmetric ovary size were discussed with Dr. Pacheco by  telephone at 2/10/2018 12:34 AM by Dr. Chung Castellanos.   CBC with platelets differential   Result Value Ref Range    WBC 4.2 4.0 - 11.0 10e9/L    RBC Count 4.63 3.8 - 5.2 10e12/L    Hemoglobin 14.4 11.7 - 15.7 g/dL    Hematocrit 43.3 35.0 - 47.0 %    MCV 94 78 - 100 fl    MCH 31.1 26.5 - 33.0 pg    MCHC 33.3 31.5 - 36.5 g/dL    RDW 12.0 10.0 - 15.0 %    Platelet Count 147 (L) 150 - 450 10e9/L    Diff Method Automated Method     % Neutrophils 64.5 % "    % Lymphocytes 21.5 %    % Monocytes 9.1 %    % Eosinophils 4.5 %    % Basophils 0.2 %    % Immature Granulocytes 0.2 %    Nucleated RBCs 0 0 /100    Absolute Neutrophil 2.7 1.6 - 8.3 10e9/L    Absolute Lymphocytes 0.9 0.8 - 5.3 10e9/L    Absolute Monocytes 0.4 0.0 - 1.3 10e9/L    Absolute Eosinophils 0.2 0.0 - 0.7 10e9/L    Absolute Basophils 0.0 0.0 - 0.2 10e9/L    Abs Immature Granulocytes 0.0 0 - 0.4 10e9/L    Absolute Nucleated RBC 0.0    Comprehensive metabolic panel   Result Value Ref Range    Sodium 137 133 - 144 mmol/L    Potassium 3.4 3.4 - 5.3 mmol/L    Chloride 105 94 - 109 mmol/L    Carbon Dioxide 25 20 - 32 mmol/L    Anion Gap 6 3 - 14 mmol/L    Glucose 87 70 - 99 mg/dL    Urea Nitrogen 13 7 - 30 mg/dL    Creatinine 0.73 0.52 - 1.04 mg/dL    GFR Estimate >90 >60 mL/min/1.7m2    GFR Estimate If Black >90 >60 mL/min/1.7m2    Calcium 8.8 8.5 - 10.1 mg/dL    Bilirubin Total 0.8 0.2 - 1.3 mg/dL    Albumin 3.9 3.4 - 5.0 g/dL    Protein Total 7.5 6.8 - 8.8 g/dL    Alkaline Phosphatase 66 40 - 150 U/L    ALT 12 0 - 50 U/L    AST 17 0 - 45 U/L   Lipase   Result Value Ref Range    Lipase 132 73 - 393 U/L   UA with Microscopic   Result Value Ref Range    Color Urine Yellow     Appearance Urine Slightly Cloudy     Glucose Urine Negative NEG^Negative mg/dL    Bilirubin Urine Negative NEG^Negative    Ketones Urine 40 (A) NEG^Negative mg/dL    Specific Gravity Urine 1.018 1.003 - 1.035    Blood Urine Negative NEG^Negative    pH Urine 5.5 5.0 - 7.0 pH    Protein Albumin Urine 10 (A) NEG^Negative mg/dL    Urobilinogen mg/dL Normal 0.0 - 2.0 mg/dL    Nitrite Urine Negative NEG^Negative    Leukocyte Esterase Urine Negative NEG^Negative    Source Midstream Urine     WBC Urine 1 0 - 2 /HPF    RBC Urine <1 0 - 2 /HPF    Bacteria Urine Few (A) NEG^Negative /HPF    Squamous Epithelial /HPF Urine 7 (H) 0 - 1 /HPF    Transitional Epi <1 0 - 1 /HPF    Mucous Urine Present (A) NEG^Negative /LPF   Creatinine POCT   Result Value  Ref Range    Creatinine 0.8 0.52 - 1.04 mg/dL    GFR Estimate 90 >60 mL/min/1.7m2    GFR Estimate If Black >90 >60 mL/min/1.7m2   hCG qual urine POCT   Result Value Ref Range    HCG Qual Urine Negative neg    Internal QC OK Yes        A&P: Ms. Mcmahon is a 22yo G0 with diffuse abdominal cramping, nausea, vomiting and loose stool most consistent with GI etiology. Right ovarian enlargement nonspecific with overall normal size.  This patient does not need to be admitted for observation to the gynecology service. She was given precautions to return for unremitting pain localizing to her lower quadrant (particularly the right), or fever with temp > 100.4F      Zari Perez MD  OB/GYN PGY2  2/10/2018 12:59 AM     I discussed the patient with Dr. Perez and reviewed the ultrasound imaging. I agree with the assessment and plan of care as documented in the above note with edits by me. Additional comments: presentation less consistent with torsion given associated GI symptoms (diarrhea/vomiting), bilateral flow on doppler and normal right ovary.     Enid Rubin MD  5:59 AM

## 2018-02-10 NOTE — DISCHARGE SUMMARY
"ED Observation Discharge Summary    Gabriela Mcmahon   MRN# [id number]  Age: 23 year old   YOB: 1995            Date of Admission:  2/9/2018    Date of Discharge:  2/10/2018  Admitting Physician:  Meena Cook MD  Discharge Physician: Dr. Joyce MD/Maura Resendez CNP        DISCHARGE DIAGNOSIS:     Nausea vomiting and diarrhea    * No resolved hospital problems. *      INTERVAL HISTORY: VSS, afebrile. Eating and drinking well. Tolerated grilled cheese for lunch. No abdominal pain and feels ready to discharge. Denies fevers, chills, headaches, cough, SOB, wheezing, chest pain/pressure, abdominal pain, N/V, constipation, melena/hematochezia, diarrhea, dysuria, hematuria, extremity swelling/weakness/numbness/tingling, or signs of active bleeding.        PHYSICAL EXAM:   Blood pressure 97/58, pulse 69, temperature 98.4  F (36.9  C), temperature source Oral, resp. rate 18, height 1.626 m (5' 4\"), weight 55.1 kg (121 lb 8 oz), SpO2 98 %, not currently breastfeeding.     GENERAL APPEARENCE: Pleasant, generally appears well, A/O x4. NAD.  SKIN: Clean, dry, and intact without visible lesions, rash, jaundice, cyanosis, erythema, ecchymoses to exposed areas. No hair or nail changes.  HEENT/NECK: NCAT w/out masses, lesions, or abnormalities. Sclera anicteric, PERRLA, EOMI.  Oral mucosa pink and moist without erythema, exudate, lesions, ulcerations, or thrush. Teeth and gums normal. Neck supple, no masses. No jugular venous distention.   CARDIOVASCULAR: S1, S2 RRR. No murmurs, rubs, or gallops.   RESPIRATORY: Respiratory effort WNL. CTA  bilaterally without crackles/rales/wheeze   GI: Active BS in all 4 quadrants. Abdomen soft and non-tender. No masses or hepatosplenomegaly.  : Deferred  MUSCULOSKELETAL: Gait is steady. Strength 5/5 in major muscle groups of bilateral UE and LE.  Extremities normal, no gross deformities noted, non-tender to palpation.   PV: 2+ bilateral radial and pedal pulses. No " edema noted.   NEURO: CN II-XII grossly intact. Speech normal. Appropriate throughout interview.   HEME/LYMPH: No visible bleeding.   PSYCHIATRIC: Mentation and affect appear normal  VASCULAR ACCESS: CDI without erythema or discharge. Non-tender.    PROCEDURES AND IMAGING:   Results for orders placed or performed during the hospital encounter of 02/09/18 (from the past 24 hour(s))   CBC with platelets differential   Result Value Ref Range    WBC 4.2 4.0 - 11.0 10e9/L    RBC Count 4.63 3.8 - 5.2 10e12/L    Hemoglobin 14.4 11.7 - 15.7 g/dL    Hematocrit 43.3 35.0 - 47.0 %    MCV 94 78 - 100 fl    MCH 31.1 26.5 - 33.0 pg    MCHC 33.3 31.5 - 36.5 g/dL    RDW 12.0 10.0 - 15.0 %    Platelet Count 147 (L) 150 - 450 10e9/L    Diff Method Automated Method     % Neutrophils 64.5 %    % Lymphocytes 21.5 %    % Monocytes 9.1 %    % Eosinophils 4.5 %    % Basophils 0.2 %    % Immature Granulocytes 0.2 %    Nucleated RBCs 0 0 /100    Absolute Neutrophil 2.7 1.6 - 8.3 10e9/L    Absolute Lymphocytes 0.9 0.8 - 5.3 10e9/L    Absolute Monocytes 0.4 0.0 - 1.3 10e9/L    Absolute Eosinophils 0.2 0.0 - 0.7 10e9/L    Absolute Basophils 0.0 0.0 - 0.2 10e9/L    Abs Immature Granulocytes 0.0 0 - 0.4 10e9/L    Absolute Nucleated RBC 0.0    Comprehensive metabolic panel   Result Value Ref Range    Sodium 137 133 - 144 mmol/L    Potassium 3.4 3.4 - 5.3 mmol/L    Chloride 105 94 - 109 mmol/L    Carbon Dioxide 25 20 - 32 mmol/L    Anion Gap 6 3 - 14 mmol/L    Glucose 87 70 - 99 mg/dL    Urea Nitrogen 13 7 - 30 mg/dL    Creatinine 0.73 0.52 - 1.04 mg/dL    GFR Estimate >90 >60 mL/min/1.7m2    GFR Estimate If Black >90 >60 mL/min/1.7m2    Calcium 8.8 8.5 - 10.1 mg/dL    Bilirubin Total 0.8 0.2 - 1.3 mg/dL    Albumin 3.9 3.4 - 5.0 g/dL    Protein Total 7.5 6.8 - 8.8 g/dL    Alkaline Phosphatase 66 40 - 150 U/L    ALT 12 0 - 50 U/L    AST 17 0 - 45 U/L   Lipase   Result Value Ref Range    Lipase 132 73 - 393 U/L   UA with Microscopic   Result Value  Ref Range    Color Urine Yellow     Appearance Urine Slightly Cloudy     Glucose Urine Negative NEG^Negative mg/dL    Bilirubin Urine Negative NEG^Negative    Ketones Urine 40 (A) NEG^Negative mg/dL    Specific Gravity Urine 1.018 1.003 - 1.035    Blood Urine Negative NEG^Negative    pH Urine 5.5 5.0 - 7.0 pH    Protein Albumin Urine 10 (A) NEG^Negative mg/dL    Urobilinogen mg/dL Normal 0.0 - 2.0 mg/dL    Nitrite Urine Negative NEG^Negative    Leukocyte Esterase Urine Negative NEG^Negative    Source Midstream Urine     WBC Urine 1 0 - 2 /HPF    RBC Urine <1 0 - 2 /HPF    Bacteria Urine Few (A) NEG^Negative /HPF    Squamous Epithelial /HPF Urine 7 (H) 0 - 1 /HPF    Transitional Epi <1 0 - 1 /HPF    Mucous Urine Present (A) NEG^Negative /LPF   Creatinine POCT   Result Value Ref Range    Creatinine 0.8 0.52 - 1.04 mg/dL    GFR Estimate 90 >60 mL/min/1.7m2    GFR Estimate If Black >90 >60 mL/min/1.7m2   hCG qual urine POCT   Result Value Ref Range    HCG Qual Urine Negative neg    Internal QC OK Yes    CT Abdomen Pelvis w Contrast    Narrative    EXAMINATION: CT ABDOMEN PELVIS W CONTRAST, 2/9/2018 9:58 PM    TECHNIQUE:  Helical CT images from the lung bases through the  symphysis pubis were obtained with contrast.  Coronal and sagittal  reformatted images were generated at a workstation for further  assessment.    CONTRAST:  77 ml Isovue 370.    COMPARISON: None.    HISTORY: Periumbilical abdominal pain with nausea and vomiting;  concern for acute appendicitis.;     FINDINGS:    Lines and tubes: None.    Lung bases: No consolidation or pleural effusion. Mild subsegmental  bibasilar atelectasis.    Abdomen:  The appendix is not visualized. No pericecal inflammation. No  distended or thickened bowel.     The right ovary demonstrates normal follicular architecture and  measures 4.3 x 3.4 x 3.7 cm while the left ovary has a similar  appearance, but measures 2.9 x 1.8 x 4.4 cm. The right ovary may be in  a slightly  different orientation than the left, otherwise  demonstrating normal architecture and enhancement without surrounding  fat stranding. No definite twisting of the pedicle. There is minimal  free fluid in the pelvis. Uterus is normal in appearance with IUD is  located within the endometrial canal.     Liver, gallbladder, spleen, adrenal glands, pancreas, and bladder are  unremarkable. No urinary stones or hydronephrosis. Normal caliber  abdominal aorta. Numerous mesenteric lymph nodes which are not  enlarged by size criteria and likely related reactive.    Bones and soft tissues: No suspicious osseous lesions.       Impression    IMPRESSION:   1. Although appendix is not visualized, there is no secondary evidence  of appendicitis including pericecal inflammation or reactive bowel  wall thickening.  2. Asymmetric enlargement of the right ovary, which has slightly  different orientation as compared to the left. Otherwise the right  ovary is similar in appearance to the left, demonstrating normal  parenchymal enhancement without surrounding fat stranding. If there is  clinical concern for torsion, recommend pelvic ultrasound for further  evaluation.    I have personally reviewed the examination and initial interpretation  and I agree with the findings.    DYLAN KAUFMAN MD   US Pelvic Complete w Transvaginal & Abd/Pel Duplex Limited    Narrative    EXAMINATION: US PELVIS COMPLETE W TRANSVAGINAL AND DOPPLER LIMITED,  2/10/2018 12:26 AM     COMPARISON: CT 2/9/2018    HISTORY: Right lower quadrant pain, abnormal right ovary on CT scan.    TECHNIQUE: The was scanned in standard fashion with transabdominal and  transvaginal transducer(s) using grey scale, color Doppler, and  spectral flow techniques.    FINDINGS:  Normal arterial blood flow to both ovaries.  No evidence of an adnexal  mass.  The right ovary measures 4.2 x 2.4 x 3.1 cm (volume=15 cm3) and  the left ovary measures 2.6 x 1.8 x 1.3 cm (volume=3.0 cm3).  Reported  tenderness when scanning over the right ovary. The right and left  ovary demonstrate relatively similar echogenicity. No fluid  surrounding the right ovary.      The uterus measures 6 x 2.4 x 4.1 cm, and there is no evidence of a  focal fibroid.  The endometrium is within normal limits and measures 1  mm. IUD is present within the endometrial canal, arms well deployed.  There is no free fluid in the pelvis.      Impression    IMPRESSION:   The right ovary is 5 times larger than the left with associated  tenderness when scanning over the right ovary. Otherwise, symmetric  parenchymal echogenicity with normal arterial blood flow. No pelvic  free fluid. Findings are not diagnostic of torsion, although early  torsion cannot be completely excluded.    Findings of asymmetric ovary size were discussed with Dr. Pacheco by  telephone at 2/10/2018 12:34 AM by Dr. Chung Castellanos.    I have personally reviewed the examination and initial interpretation  and I agree with the findings.    DYLAN KAUFMAN MD   Magnesium   Result Value Ref Range    Magnesium 1.8 1.6 - 2.3 mg/dL   Phosphorus   Result Value Ref Range    Phosphorus 3.0 2.5 - 4.5 mg/dL   Basic metabolic panel   Result Value Ref Range    Sodium 140 133 - 144 mmol/L    Potassium 3.5 3.4 - 5.3 mmol/L    Chloride 109 94 - 109 mmol/L    Carbon Dioxide 20 20 - 32 mmol/L    Anion Gap 11 3 - 14 mmol/L    Glucose 54 (L) 70 - 99 mg/dL    Urea Nitrogen 12 7 - 30 mg/dL    Creatinine 0.72 0.52 - 1.04 mg/dL    GFR Estimate >90 >60 mL/min/1.7m2    GFR Estimate If Black >90 >60 mL/min/1.7m2    Calcium 8.3 (L) 8.5 - 10.1 mg/dL   Lactic acid whole blood   Result Value Ref Range    Lactic Acid 0.5 (L) 0.7 - 2.0 mmol/L   Procalcitonin   Result Value Ref Range    Procalcitonin 0.09 ng/ml   CBC with platelets differential   Result Value Ref Range    WBC 3.5 (L) 4.0 - 11.0 10e9/L    RBC Count 4.16 3.8 - 5.2 10e12/L    Hemoglobin 12.7 11.7 - 15.7 g/dL    Hematocrit 38.8 35.0 - 47.0 %     "MCV 93 78 - 100 fl    MCH 30.5 26.5 - 33.0 pg    MCHC 32.7 31.5 - 36.5 g/dL    RDW 12.0 10.0 - 15.0 %    Platelet Count 145 (L) 150 - 450 10e9/L    Diff Method Automated Method     % Neutrophils 39.7 %    % Lymphocytes 41.3 %    % Monocytes 11.8 %    % Eosinophils 6.6 %    % Basophils 0.3 %    % Immature Granulocytes 0.3 %    Nucleated RBCs 0 0 /100    Absolute Neutrophil 1.4 (L) 1.6 - 8.3 10e9/L    Absolute Lymphocytes 1.4 0.8 - 5.3 10e9/L    Absolute Monocytes 0.4 0.0 - 1.3 10e9/L    Absolute Eosinophils 0.2 0.0 - 0.7 10e9/L    Absolute Basophils 0.0 0.0 - 0.2 10e9/L    Abs Immature Granulocytes 0.0 0 - 0.4 10e9/L    Absolute Nucleated RBC 0.0    Glucose by meter   Result Value Ref Range    Glucose 54 (L) 70 - 99 mg/dL   Glucose by meter   Result Value Ref Range    Glucose 186 (H) 70 - 99 mg/dL   Glucose by meter   Result Value Ref Range    Glucose 99 70 - 99 mg/dL     DISCHARGE MEDICATIONS:   Current Discharge Medication List      START taking these medications    Details   ondansetron (ZOFRAN-ODT) 4 MG ODT tab Take 1 tablet (4 mg) by mouth every 6 hours as needed for nausea or vomiting  Qty: 10 tablet, Refills: 0    Associated Diagnoses: Non-intractable vomiting with nausea, unspecified vomiting type         CONTINUE these medications which have NOT CHANGED    Details   albuterol (PROAIR HFA/PROVENTIL HFA/VENTOLIN HFA) 108 (90 BASE) MCG/ACT Inhaler Inhale 2 puffs into the lungs      Levonorgestrel (MIRENA, 52 MG, IU)                CONSULTATIONS:   Consultation during this admission received from:  OB/GYN    BRIEF HISTORY OF PRESENT ILLNESS:   (Adopted from admission H&P).    \"Gabriela Mcmahon is a 23 year old female with a history of sports induced asthma who presented to the ED with abdominal pain, nausea, vomiting and diarrhea. She had some soup from [employer] at about 7pm on Thursday evening, 2 days ago. However instantly she started feeling sick to her stomach as well as abdominal pain. Then around 12am started " "having non bloody vomiting and non bloody diarrhea. Overall she reports 4 episodes of vomiting with last episode about 7pm last night and 2 episodes of diarrhea last episode yesterday morning. Abdominal pain is in upper quadrants described as cramps and ache ~ 4/10.  Admits to feeling lightheaded, tachycardia, decreased UOP, subjective fever, chills, myalgias. Denies any abdominal surgeries, known sick contacts, recent antibiotic use, recent travel, STDs, URI symptoms. Took some ibuprofen at home. Denies any dysuria, hematuria, abnormal vaginal discharge.      In the ED, , 's-130's/60's-90's, RR 18, SaO2 96-99% on RA, Temp 97.7. Labs show normal CMP, lipase, CBC. Bhcg is negative. UA with 40 ketones, 10 protein. CT abdomen pelvis reports: \"1. Appendix is not visualized, but the terminal ileum is located and there is no adjacent pericecal fat stranding or bowel wall thickening to indicate local edema. 2. Slightly asymmetric size of the right ovary, with slightly different orientation relative to the left. Normal enhancement. Recommend pelvic ultrasound if clinically indicated\". Pelvic ultrasound reports: \"The right ovary volume is 5 times that of the left, but still less than 20 cm. Normal blood flow is present. Findings are not diagnostic of torsion, although an early torsion cannot be excluded.\" GYN was consulted in the ED with impression that right ovarian enlargement was nonspecific with overall normal size.  In the ED the patient was given 1L LR bolus and zofran 4mg IV x 1. She continues to feel nauseous and is being admitted for supportive care.      On admission to the observation unit the patient was continuing to complain of nausea.\"    ED OBSERVATION COURSE: Gabriela Mcmahon is a 23 year old female with a history of sports induced asthma who presented to the ED with abdominal pain, nausea, vomiting and diarrhea.    1. Abdominal Pain, Nausea, Vomiting: ~1 day of symptoms. 4 non bloody emesis. 2 " "non bloody stools. Last episodes yesterday. Reports feeling lightheaded, tachycardia, decreased UOP, subjective fever, chills, myalgias. Denies any abdominal surgeries, known sick contacts, recent antibiotic use, recent travel, STDs, URI symptoms, dysuria, abnormal vaginal discharge. In the ED, , 's-130's/60's-90's, RR 18, SaO2 96-99% on RA, Temp 97.7. Labs show normal CMP, lipase, CBC. Bhcg is negative. UA with 40 ketones, 10 protein. CT abdomen pelvis reports: \"1. Appendix is not visualized, but the terminal ileum is located and there is no adjacent pericecal fat stranding or bowel wall thickening to indicate local edema. 2. Slightly asymmetric size of the right ovary, with slightly different orientation relative to the left. Normal enhancement. Recommend pelvic ultrasound if clinically indicated\". Pelvic ultrasound reports: \"The right ovary volume is 5 times that of the left, but still less than 20 cm. Normal blood flow is present. Findings are not diagnostic of torsion, although an early torsion cannot be excluded.\" GYN was consulted in the ED with impression that right ovarian enlargement was nonspecific with overall normal size.  In the ED the patient was given 1L LR bolus and zofran 4mg IV x 1. She continued to feel nauseous and was admitted to the Observation Unit for supportive care. Lips and oral mucosa dry. Heart rate borderline tachycardia at admission This morning her vitals are stable. She was monitored all day with resolution of her symptoms. Eating regular diet at discharge. Will discharge with a short supply of ODT zofran. Stool culture and C-Diff were ordered but did not obtain as she did not produce a stool. She was instructed to return to the ED with fever, uncontrolled nausea, vomiting, unrelieved pain, bleeding not relieved with pressure, dizziness, chest pain, shortness of breath, loss of consciousness, and any new or concerning symptoms. Her WBC and PLT counts were mildly low and " recommended she follow-up to have these rechecked as an out-patient within the next 7 days.           2. Enlarged Right Ovary: OB/GYN consulted and did not believe this was an acute finding. Recommended follow-up in 1 month with out-patient GYN. She was instructed to return for unremitting pain localizing to her lower quadrant (particularly the right), or fever with temp > 100.4F  DISCHARGE DISPOSITION:   Discharged to home. The patient was discharged in a stable condition.     DISCHARGE INSTRUCTIONS AND FOLLOW-UP:    Discharge Procedure Orders  OB/GYN REFERRAL   Referral Type: OB/GYN     Reason for your hospital stay   Order Comments: You were admitted with     Reason for your hospital stay   Order Comments: You were admitted with nausea and vomiting. Your symptoms resolved. You felt better and can discharge to home.     We did a CT which showed your right ovary is larger than the left. OB saw you and did not think this was anything to be concerned about at this time. They recommended to follow-up in 1 month and return to the ED with pain to the right lower abdomen of fevers.     Adult [facility] Follow-up and recommended labs and tests   Order Comments: Follow up with primary care provider, [facility], within 7 days for hospital follow- up.  The following labs/tests are recommended: CBC.    OB/GYN within 1 month       Appointments on [place] and/or [facility] (with [facility] or [facility] provider or service). Call [phone] if you haven't heard regarding these appointments within 7 days of discharge.     Activity   Order Comments: Your activity upon discharge: activity as tolerated and no driving for today   Order Specific Question Answer Comments   Is discharge order? Yes      When to contact your care team   Order Comments: Return to the ED with fever, uncontrolled nausea, vomiting, unrelieved pain, bleeding not relieved with pressure, dizziness, chest pain, shortness of breath, loss of consciousness,  and any new or concerning symptoms.     Diet   Order Comments: Follow this diet upon discharge: Orders Placed This Encounter     Advance Diet as Tolerated: Regular Diet Adult   Order Specific Question Answer Comments   Is discharge order? Yes         Attestation:   I have reviewed today's vital signs, notes, medications, labs and imaging.      SUZETTE Swanson, CNP  Emergency Department Observation Unit

## 2018-02-10 NOTE — ED PROVIDER NOTES
History     Chief Complaint   Patient presents with     Abdominal Pain     Nausea & Vomiting     HPI  Gabriela Mcmahon is a 23 year old, otherwise healthy female who presents with abdominal pain, nausea, vomiting and diarrhea. The patient reports that she developed nausea and diffuse abdominal pain yesterday around 7PM yesterday, then started vomiting around 12 AM today. She reports having 4 episodes of vomiting today with the last around 7PM. She notes that she has had green liquid emesis with her last 2 episodes of vomiting. She denies any dysuria or hematuria, though reports she has only urinated twice today. She also complains of 2 episodes of diarrhea today. Her last bowel movement prior to the diarrhea today was yesterday morning. She reports that she has had a slight fever and has been taking ibuprofen at home. She denies any history of abdominal surgeries or any chronic medical problems. She complains of continued, mild to moderate, non-radiating, diffuse, constant abdominal pain, nausea, and myalgias currently. She denies cough.  No vaginal bleeding or vaginal discharge.  No dysuria or flank pain.    No past medical history on file.    No past surgical history on file.    Family History   Problem Relation Age of Onset     Skin Cancer Maternal Grandmother      Skin Cancer Paternal Grandmother      Melanoma No family hx of        Social History   Substance Use Topics     Smoking status: Never Smoker     Smokeless tobacco: Never Used     Alcohol use Not on file     Current Facility-Administered Medications   Medication     ondansetron (ZOFRAN) injection 4 mg     Current Outpatient Prescriptions   Medication     albuterol (PROAIR HFA/PROVENTIL HFA/VENTOLIN HFA) 108 (90 BASE) MCG/ACT Inhaler     Levonorgestrel (MIRENA, 52 MG, IU)        Allergies   Allergen Reactions     Amoxicillin Rash      I have reviewed the Medications, Allergies, Past Medical and Surgical History, and Social History in the Epic  "system.    Review of Systems   Constitutional: Negative for chills and fever.   HENT: Negative for congestion.    Eyes: Negative for visual disturbance.   Respiratory: Negative for shortness of breath.    Cardiovascular: Negative for chest pain.   Gastrointestinal: Positive for abdominal pain, nausea and vomiting.   Endocrine: Negative for polydipsia and polyuria.   Genitourinary: Negative for difficulty urinating.   Musculoskeletal: Negative for back pain, neck pain and neck stiffness.   Skin: Negative for color change.   Allergic/Immunologic: Negative for immunocompromised state.   Neurological: Negative for light-headedness.   Hematological: Negative for adenopathy. Does not bruise/bleed easily.   Psychiatric/Behavioral: Negative for agitation and behavioral problems.       Physical Exam   BP: 136/90  Pulse: 103  Temp: 97.7  F (36.5  C)  Resp: 18  Height: 162.6 cm (5' 4\")  Weight: 56.7 kg (125 lb)  SpO2: 99 %      Physical Exam   Constitutional: She is oriented to person, place, and time. She appears well-developed and well-nourished. No distress.   HENT:   Head: Atraumatic.   Mouth/Throat: Oropharynx is clear and moist. No oropharyngeal exudate.   Eyes: Conjunctivae and EOM are normal. No scleral icterus.   Neck: Normal range of motion.   Cardiovascular: Normal heart sounds and intact distal pulses.    Mild tachycardia   Pulmonary/Chest: Effort normal and breath sounds normal. No respiratory distress. She has no wheezes. She has no rales.   Abdominal: Soft. Normal appearance and bowel sounds are normal. She exhibits no distension. There is tenderness in the periumbilical area. There is no rebound, no guarding, no CVA tenderness, no tenderness at McBurney's point and negative Diaz's sign.   Musculoskeletal: Normal range of motion. She exhibits no edema or tenderness.   Neurological: She is alert and oriented to person, place, and time. No cranial nerve deficit. She exhibits normal muscle tone. Coordination " normal.   Skin: Skin is warm. No rash noted. She is not diaphoretic.   Psychiatric: She has a normal mood and affect. Her behavior is normal. Judgment and thought content normal.   Nursing note and vitals reviewed.      ED Course     ED Course     Procedures     8:24 PM  The patient was seen and examined by Dr. Pacheco in Room 17.               Critical Care time:  none             Labs Ordered and Resulted from Time of ED Arrival Up to the Time of Departure from the ED   CBC WITH PLATELETS DIFFERENTIAL - Abnormal; Notable for the following:        Result Value    Platelet Count 147 (*)     All other components within normal limits   ROUTINE UA WITH MICROSCOPIC - Abnormal; Notable for the following:     Ketones Urine 40 (*)     Protein Albumin Urine 10 (*)     Bacteria Urine Few (*)     Squamous Epithelial /HPF Urine 7 (*)     Mucous Urine Present (*)     All other components within normal limits   HCG QUAL URINE POCT - Normal   COMPREHENSIVE METABOLIC PANEL   LIPASE   CREATININE POCT   ISTAT CREATININE NURSING POCT   PERIPHERAL IV CATHETER            Assessments & Plan (with Medical Decision Making)   This is a 23 year old female presenting with nausea vomiting and abdominal pain for 2 days. Differential diagnosis: Acute appendicitis, acute pancreatitis, dehydration, hepatitis, unlikely SBO or volvulus.    After thorough history and physical examination, patient appears to be in mild distress secondary to nausea.  Will obtain laboratory studies and hydrate her with a bolus of Lactated Ringer's treat her nausea with IV Zofran.  I will obtain CT abdomen/pelvis for further evaluation of possible acute appendicitis.  She agrees with the plan.    Patient's laboratory studies returned with no leukocytosis, WBC is normal at 4200.  There is no anemia, hemoglobin is normal 14.4.  Electrolytes show no evidence of dehydration, creatinine is normal at 0.73.  LFTs and lipase are normal.  Urinalysis shows no evidence of  infection.  Patient is not pregnant.  I reviewed her CT abdomen/pelvis and I read the radiology report; appendix is not visualized.  There is a somewhat asymmetric size of the right ovary with a different orientation relative to the left.  I did obtain pelvic ultrasound for further evaluation.  I reviewed the study and I read the radiology report; there is blood flow to the right ovary, however, the volume of the right ovary is 5 times greater than left.  This is rather unusual and concerning.  There is a thought that patient may be having a partial torsion/detorsion of the right ovary.  OB/GYN service was consulted and they will evaluate the patient.  She was informed about these findings.    Patient will be signed out to my partner pending OB/GYN evaluation and disposition.      I have reviewed the nursing notes.    I have reviewed the findings, diagnosis, plan and need for follow up with the patient.  This part of the document was transcribed by Anca Gibbs Medical Scribe.   New Prescriptions    No medications on file       Final diagnoses:   Non-intractable vomiting with nausea, unspecified vomiting type   Ovarian enlargement, right   Abdominal pain, generalized     I, Josephine Goodman, am serving as a trained medical scribe to document services personally performed by Yvette Pacheco MD, based on the provider's statements to me.   Yvette TAM MD, was physically present and have reviewed and verified the accuracy of this note documented by Josephine Goodman.     2/9/2018   [facility], [place], EMERGENCY DEPARTMENT     Yvette Pacheco MD  02/10/18 0129

## 2018-02-10 NOTE — PLAN OF CARE
Problem: Patient Care Overview  Goal: Plan of Care/Patient Progress Review  Pt arrived on cart from ED around 0500. Observation pt admitted for abdominal cramping and N/V. A&Ox4, VSS with soft BP's, room air. Denies pain. Zofran given for nausea relief. PIV infusing MIVF Blous. Up independently. Admission questions complete, given call light, and oriented to room. Will continue to monitor.

## 2018-02-10 NOTE — ED NOTES
Patient presents to triage c/o abdominal cramping, nausea and vomiting since yesterday. Patient states she has been unable to eat or drink since yesterday.

## 2018-02-10 NOTE — PLAN OF CARE
Problem: Patient Care Overview  Goal: Plan of Care/Patient Progress Review  Outcome: Improving  Observation Goals:  Nausea/vomiting (diarrhea if present) improved.   - Tolerating oral intake to maintain hydration -MET  - Vital signs normal or at patient baseline and orthostatic vitals are normal and patient not lightheaded with standing -MET  - Diagnostic tests and consults completed and resulted if ordered -Waiting on stool  - Adequate pain control on oral analgesia -MET  - Tolerating oral antibiotics if ordered -NA  - Safe disposition plan has been identified   - Nurse to notify provider when observation goals have been met and patient is ready for discharge.    All discharge orders not met.

## 2018-02-10 NOTE — TELEPHONE ENCOUNTER
"Patient calling to report vomiting for over 24 hours and not able to keep any food/fluids down. Has had 2 diarrhea stools. Feels very week and dizzy when she stands. She thinks she is dehydrated.   Reason for Disposition    [1] Drinking very little AND [2] dehydration suspected (e.g., no urine > 12 hours, very dry mouth, very lightheaded)    Additional Information    Negative: Shock suspected (e.g., cold/pale/clammy skin, too weak to stand, low BP, rapid pulse)    Negative: Difficult to awaken or acting confused  (e.g., disoriented, slurred speech)    Negative: Sounds like a life-threatening emergency to the triager    Negative: Vomiting occurs only while coughing    Negative: [1] Pregnant < 20 Weeks AND [2] nausea/vomiting began in early pregnancy (i.e., 4-8 weeks pregnant)    Negative: Chest pain    Negative: [1] SEVERE headache AND [2] similar to prior migraines    Negative: [1] Vomiting AND [2] contains red blood or black (\"coffee ground\") material  (Exception: few red streaks in vomit that only happened once)    Negative: Severe pain in one eye    Negative: Recent head injury (within last 3 days)    Negative: Recent abdominal injury (within last 3 days)    Negative: [1] Insulin-dependent diabetes (Type I) AND [2] glucose > 400 mg/dl (22 mmol/l)    Negative: [1] SEVERE vomiting (e.g., 6 or more times/day) AND [2] present > 8 hours    Negative: [1] MODERATE vomiting (e.g., 3 - 5 times/day) AND [2] age > 60    Negative: Severe headache (e.g., excruciating) (Exception: similar to previous migraines)    Negative: High-risk adult (e.g., diabetes mellitus, brain tumor, V-P shunt, hernia)    Protocols used: VOMITING-ADULT-    "

## 2018-02-11 NOTE — PLAN OF CARE
Problem: Patient Care Overview  Goal: Plan of Care/Patient Progress Review  Outcome: Adequate for Discharge Date Met: 02/10/18  VSS, afebrile Denies pain. Up ad ryan. Denies diziness. Denies n/v. Appetite good. Ate 75% of meal. Denies abdominal pain/n/v. Passing gas. BG WNL. Pt has met observation goals. Discharge orders reviewed with patient. Verbalized understanding. PIV removed catheter intact. Pt waiting for transportation home. Will walk self to pharmacy and front door.

## 2018-02-12 ENCOUNTER — CARE COORDINATION (OUTPATIENT)
Dept: CARE COORDINATION | Facility: CLINIC | Age: 23
End: 2018-02-12

## 2018-02-13 NOTE — PROGRESS NOTES
Patient was called three times and no answer so post 24 hr DC follow up calls will be closed out

## 2018-06-14 ENCOUNTER — OFFICE VISIT - HEALTHEAST (OUTPATIENT)
Dept: FAMILY MEDICINE | Facility: CLINIC | Age: 23
End: 2018-06-14

## 2018-06-14 ENCOUNTER — COMMUNICATION - HEALTHEAST (OUTPATIENT)
Dept: TELEHEALTH | Facility: CLINIC | Age: 23
End: 2018-06-14

## 2018-06-14 DIAGNOSIS — R07.9 CHEST PAIN OF UNKNOWN ETIOLOGY: ICD-10-CM

## 2018-06-14 DIAGNOSIS — J45.21 EXACERBATION OF INTERMITTENT ASTHMA: ICD-10-CM

## 2018-06-14 DIAGNOSIS — R05.9 COUGH: ICD-10-CM

## 2018-06-14 DIAGNOSIS — R07.89 OTHER CHEST PAIN: ICD-10-CM

## 2018-06-14 ASSESSMENT — MIFFLIN-ST. JEOR: SCORE: 1284.3

## 2019-02-06 ENCOUNTER — OFFICE VISIT (OUTPATIENT)
Dept: DERMATOLOGY | Facility: CLINIC | Age: 24
End: 2019-02-06
Payer: COMMERCIAL

## 2019-02-06 DIAGNOSIS — D48.5 NEOPLASM OF UNCERTAIN BEHAVIOR OF SKIN: Primary | ICD-10-CM

## 2019-02-06 DIAGNOSIS — D22.9 MULTIPLE PIGMENTED NEVI: ICD-10-CM

## 2019-02-06 DIAGNOSIS — Z86.018 HISTORY OF DYSPLASTIC NEVUS: ICD-10-CM

## 2019-02-06 DIAGNOSIS — Z12.83 SKIN CANCER SCREENING: ICD-10-CM

## 2019-02-06 ASSESSMENT — PAIN SCALES - GENERAL
PAINLEVEL: NO PAIN (0)
PAINLEVEL: NO PAIN (0)

## 2019-02-06 NOTE — LETTER
2/6/2019       RE: Gabriela Mcmahon  [address] [place] [address]     Dear Colleague,    Thank you for referring your patient, Gabriela Mcmahon, to the [facility] DERMATOLOGY at [facility]. Please see a copy of my visit note below.        Pictures were placed in Pt's chart today for future reference.      [facility] Dermatology Note      Dermatology Problem List:  1.Hx moderately dysplastic nevus on left scapula and left thigh, s/p ED&C with PCP- 1/15/2018  2. Bx left anterior thigh 2/6/19  3. Skin cancer screening 2/6/19    CC:   Chief Complaint   Patient presents with     Skin Check     Gabriela is here today for a skin check- Gabriela notes an area of concern on her abdomen.          Encounter Date: Feb 6, 2019    History of Present Illness:  Ms. Gabriela Mcmahon is a 24 year old female who presents to the dermatology department after one year. She last had a skin check with Vicki Urban 2/2/18. She thinks a mole on her abdomen could be slightly bigger but is unsure. She denies any pruritus, bleeding or other changes to the site. She uses SPF 55 on a regular basis, and states she burns easily. She is otherwise healthy. She reports a her mother just had a skin cancer removed and her grandparents have had skin cancers removed, but unsure of type.    She uses an albuterol inhaler prn for exercise induced asthma and has a Mirena IUD. She is feeling well otherwise, without other skin concerns.     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 Originally from [place]. The patient denies use of  tanning beds.    Family History:  There is a distant family history of skin cancer (grandparents) and mother, but patient is not sure what kind.    Medications:  Current Outpatient Medications   Medication Sig Dispense Refill     albuterol (PROAIR HFA/PROVENTIL HFA/VENTOLIN HFA) 108 (90 BASE) MCG/ACT Inhaler Inhale 2 puffs into the lungs       Levonorgestrel (MIRENA, 52 MG, IU)        ondansetron (ZOFRAN-ODT) 4 MG ODT tab Take 1 tablet (4 mg) by mouth every 6 hours as needed for nausea or vomiting (Patient not taking: Reported on 2/6/2019) 10 tablet 0     Allergies   Allergen Reactions     Amoxicillin Rash         Review of Systems:  -As per HPI  -Constitutional: The patient denies fatigue, fevers, chills, unintended weight loss, and night sweats.  -HEENT: Patient denies nonhealing oral sores.  -Skin: As above in HPI. No additional skin concerns.    Physical exam:  Vitals: There were no vitals taken for this visit.  GEN: This is a well developed, well-nourished female in no acute distress, in a pleasant mood.    SKIN: Full skin, which includes the head/face, both arms, chest, back, abdomen, both legs, genitalia and/or groin buttocks, digits and/or nails, was examined.  -There is a 4 mm asymmetric variegated macule on the left anterior thigh.  -Multiple regular brown pigmented macules and papules are identified on the torso (including abdomen), arms and legs.   -There are atrophic skin colored plaques on the left lateral thigh and left scapula area.    -No other lesions of concern on areas examined.     Impression/Plan:  1. 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     After discussion of benefits and risks including but not limited to bleeding, infection, scar, incomplete removal, recurrence, and non-diagnostic biopsy, written consent and photographs were obtained. The area was cleaned with isopropyl alcohol. 0.5 mL of 1% lidocaine with epinephrine was  injected to obtain adequate anesthesia of the lesion on the left anterior thigh. A shave biopsy was performed. Hemostasis was achieved with aluminium chloride. Vaseline and a sterile dressing were applied. The patient tolerated the procedure and no complications were noted. The patient was provided with verbal and written post care instructions.     2. Multiple clinically benign nevi on the trunk, arms and legs with history of dysplastic nevi on the left lateral thigh and left scapula .    ABCDs of melanoma were discussed and self skin checks were advised. , Sun precaution was advised including the use of sun screens of SPF 30 or higher, sun protective clothing, and avoidance of tanning beds., We will clinically monitor this area.        CC Dr. Lobo on close of this encounter.    Follow-up in 1 year, or sooner pending pathology report from PCP or new/changing lesions.      Staff Involved:  Staff Only    All risks, benefits and alternatives were discussed with patient.  Patient is in agreement and understands the assessment and plan.  All questions were answered.  Sun Screen Education was given.   Return to Clinic annually or sooner as needed.     Leann Shaffer PA-C

## 2019-02-06 NOTE — PROGRESS NOTES
[facility] Dermatology Note      Dermatology Problem List:  1.Hx moderately dysplastic nevus on left scapula and left thigh, s/p ED&C with PCP- 1/15/2018  2. Bx left anterior thigh 2/6/19  3. Skin cancer screening 2/6/19    CC:   Chief Complaint   Patient presents with     Skin Check     Gabriela is here today for a skin check- Gabriela notes an area of concern on her abdomen.          Encounter Date: Feb 6, 2019    History of Present Illness:  Ms. Gabriela Mcmahon is a 24 year old female who presents to the dermatology department after one year. She last had a skin check with Vicki Urban 2/2/18. She thinks a mole on her abdomen could be slightly bigger but is unsure. She denies any pruritus, bleeding or other changes to the site. She uses SPF 55 on a regular basis, and states she burns easily. She is otherwise healthy. She reports a her mother just had a skin cancer removed and her grandparents have had skin cancers removed, but unsure of type.    She uses an albuterol inhaler prn for exercise induced asthma and has a Mirena IUD. She is feeling well otherwise, without other skin concerns.     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 Originally from [place]. The patient denies use of tanning beds.    Family History:  There is a distant family history of skin cancer (grandparents) and mother, but patient is not sure what kind.    Medications:  Current Outpatient Medications   Medication Sig Dispense Refill     albuterol (PROAIR HFA/PROVENTIL HFA/VENTOLIN HFA) 108 (90 BASE) MCG/ACT Inhaler Inhale 2 puffs into the lungs        Levonorgestrel (MIRENA, 52 MG, IU)        ondansetron (ZOFRAN-ODT) 4 MG ODT tab Take 1 tablet (4 mg) by mouth every 6 hours as needed for nausea or vomiting (Patient not taking: Reported on 2/6/2019) 10 tablet 0     Allergies   Allergen Reactions     Amoxicillin Rash         Review of Systems:  -As per HPI  -Constitutional: The patient denies fatigue, fevers, chills, unintended weight loss, and night sweats.  -HEENT: Patient denies nonhealing oral sores.  -Skin: As above in HPI. No additional skin concerns.    Physical exam:  Vitals: There were no vitals taken for this visit.  GEN: This is a well developed, well-nourished female in no acute distress, in a pleasant mood.    SKIN: Full skin, which includes the head/face, both arms, chest, back, abdomen, both legs, genitalia and/or groin buttocks, digits and/or nails, was examined.  -There is a 4 mm asymmetric variegated macule on the left anterior thigh.  -Multiple regular brown pigmented macules and papules are identified on the torso (including abdomen), arms and legs.   -There are atrophic skin colored plaques on the left lateral thigh and left scapula area.    -No other lesions of concern on areas examined.     Impression/Plan:  1. 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     After discussion of benefits and risks including but not limited to bleeding, infection, scar, incomplete removal, recurrence, and non-diagnostic biopsy, written consent and photographs were obtained. The area was cleaned with isopropyl alcohol. 0.5 mL of 1% lidocaine with epinephrine was injected to obtain adequate anesthesia of the lesion on the left anterior thigh. A shave biopsy was performed. Hemostasis was achieved with aluminium chloride. Vaseline and a sterile dressing were applied. The patient tolerated the procedure and no complications were noted. The patient was provided with verbal and written post care instructions.      2. Multiple clinically benign nevi on the trunk, arms and legs with history of dysplastic nevi on the left lateral thigh and left scapula .    ABCDs of melanoma were discussed and self skin checks were advised. , Sun precaution was advised including the use of sun screens of SPF 30 or higher, sun protective clothing, and avoidance of tanning beds., We will clinically monitor this area.        CC Dr. Lobo on close of this encounter.    Follow-up in 1 year, or sooner pending pathology report from PCP or new/changing lesions.      Staff Involved:  Staff Only    All risks, benefits and alternatives were discussed with patient.  Patient is in agreement and understands the assessment and plan.  All questions were answered.  Sun Screen Education was given.   Return to Clinic annually or sooner as needed.   Leann Shaffer PA-C

## 2019-02-06 NOTE — NURSING NOTE
Dermatology Rooming Note    Gabriela Mcmahon's goals for this visit include:   Chief Complaint   Patient presents with     Skin Check     Gabriela is here today for a skin check- Gabriela notes an area of concern on her abdomen.      LIVIER Colin

## 2019-02-06 NOTE — NURSING NOTE
Lidocaine-epinephrine 1-1:029921 % injection   0.5mL once for one use, starting 2/6/2019 ending 2/6/2019,  2mL disp, R-0, injection  Injected by LIVIER Colin

## 2019-02-06 NOTE — PATIENT INSTRUCTIONS

## 2019-02-07 LAB — COPATH REPORT: NORMAL

## 2019-02-15 ENCOUNTER — HEALTH MAINTENANCE LETTER (OUTPATIENT)
Age: 24
End: 2019-02-15

## 2020-01-28 ENCOUNTER — TRANSFERRED RECORDS (OUTPATIENT)
Dept: HEALTH INFORMATION MANAGEMENT | Facility: CLINIC | Age: 25
End: 2020-01-28

## 2020-01-28 LAB
C TRACH DNA SPEC QL PROBE+SIG AMP: POSITIVE
CHOLEST SERPL-MCNC: 175 MG/DL (ref 0–199)
HBA1C MFR BLD: 4.7 % (ref 0–5.7)
HDLC SERPL-MCNC: 73 MG/DL
HIV 1&2 EXT: NORMAL
N GONORRHOEA DNA SPEC QL PROBE+SIG AMP: NEGATIVE
NONHDLC SERPL-MCNC: 102 MG/DL
PAP-ABSTRACT: NORMAL
SPECIMEN DESCRIP: NORMAL
SPECIMEN DESCRIPTION: ABNORMAL
TSH SERPL-ACNC: 1.4 UIU/ML (ref 0.3–4.5)

## 2020-02-04 ENCOUNTER — OFFICE VISIT (OUTPATIENT)
Dept: DERMATOLOGY | Facility: CLINIC | Age: 25
End: 2020-02-04
Payer: COMMERCIAL

## 2020-02-04 DIAGNOSIS — L81.4 SOLAR LENTIGO: ICD-10-CM

## 2020-02-04 DIAGNOSIS — D22.9 MULTIPLE BENIGN NEVI: Primary | ICD-10-CM

## 2020-02-04 RX ORDER — ESCITALOPRAM OXALATE 20 MG/1
20 TABLET ORAL DAILY
COMMUNITY
End: 2021-03-01

## 2020-02-04 ASSESSMENT — PAIN SCALES - GENERAL: PAINLEVEL: NO PAIN (0)

## 2020-02-04 NOTE — LETTER
2/4/2020      RE: Gabriela Mcmahon  [address] [place] [address]       [facility] Dermatology Note    Dermatology Problem List:  FBSE: 2/2/18,2/6/19, 2/4/2020  1.Hx moderately dysplastic nevus   - left scapula and left thigh, s/p ED&C with PCP- 1/15/2018  - left anterior thigh s/p bx 2/6/19    Encounter Date: Feb 4, 2020    CC:  Chief Complaint   Patient presents with     Skin Check     gabriela is here today for a skin check. No concerns.      History of Present Illness:  Ms. Gabriela Mcmahon is a 25 year old femalewho presents for a skin check. Last seen 2/6/2019 by Dr. Shaffer when a biopsy was taken from her left anterior thigh. Path was consistent with a junctional dysplastic nevus with mild atypia. Today, the patient would like the moles on her trunk and extremities evaluated. She regularly runs outdoors and burns easily. The patient is on Lexapro and has an IUD.      The patient otherwise denies any new or concerning lesions. No bleeding, painful, pruritic, or changing lesions. They report no personal history of skin cancer. There is a family history of skin cancer(mother and grandmothers, unknown type). No history of immunosuppression. No history of indoor tanning. They do use sunscreen and protective clothing when outdoors for sun protection. No occupational exposure to ultraviolet light or other forms of radiation. Patient is a student.  Health otherwise stable. No other skin concerns.     Past Medical History:   Patient Active Problem List   Diagnosis     Nausea vomiting and diarrhea     Past Medical History:   Diagnosis Date     Asthma      Past Surgical History:   Procedure Laterality Date     BIOPSY OF SKIN LESION       HC TOOTH EXTRACTION W/FORCEP         Social History:  Patient reports that she has never smoked. She has never used smokeless tobacco. She reports current alcohol use. She reports current drug use. Drug: Marijuana.    Family History:  Family History    Problem Relation Age of Onset     Skin Cancer Maternal Grandmother      Skin Cancer Paternal Grandmother      Heart Defect Father      Cerebrovascular Disease Father      Melanoma No family hx of        Medications:  Current Outpatient Medications   Medication Sig Dispense Refill     albuterol (PROAIR HFA/PROVENTIL HFA/VENTOLIN HFA) 108 (90 BASE) MCG/ACT Inhaler Inhale 2 puffs into the lungs       escitalopram (LEXAPRO) 20 MG tablet Take 20 mg by mouth daily       Levonorgestrel (MIRENA, 52 MG, IU)        ondansetron (ZOFRAN-ODT) 4 MG ODT tab Take 1 tablet (4 mg) by mouth every 6 hours as needed for nausea or vomiting (Patient not taking: Reported on 2/6/2019) 10 tablet 0       Allergies   Allergen Reactions     Amoxicillin Rash       Review of Systems:  -Constitutional: Patient is otherwise feeling well, in usual state of health.   -Skin: As above in HPI. No additional skin concerns.    Physical exam:  Vitals: There were no vitals taken for this visit.  GEN: This is a well developed, well-nourished female in no acute distress, in a pleasant mood.    SKIN: Full skin, which includes the head/face, both arms, chest, back, abdomen,both legs, genitalia and/or groin buttocks, digits and/or nails, was examined.  - Cee Type I/II  - Scattered brown macules on sun exposed areas.  - 30-40 regular brown pigmented macules and papules with normal symmetric reticular network on the trunk and extremities.   - Well healed scars on the legs from previous biopsies  - No other lesions of concern on areas examined.     Impression/Plan:    1. Multiple clinically benign lesions: solar lentigines, nevi    Recommend sunscreens SPF #30 or greater, protective clothing and avoidance of tanning beds.    ABCDs of melanoma were discussed and self skin checks were advised.     Discussed Heliocare / Heliocare Advanced as oral supplement to prevent sunburn when outdoors    Follow-up in one year for a skin check, earlier for new or  changing lesions.     Staff Involved:  Scribe/Staff    Scribe Disclosure  I, Stefanie Sona, am serving as a scribe to document services personally performed by Dr. Zach Kaba MD, based on data collection and the provider's statements to me.     Provider Disclosure:   The documentation recorded by the scribe accurately reflects the services I personally performed and the decisions made by me.    Zach Kaba MD    Department of Dermatology  [facility]: Phone: [phone], Fax:[phone]  [facility]: Phone: [phone] Fax: [phone]

## 2020-02-04 NOTE — LETTER
2/4/2020       RE: Gabriela Mcmahon  [address] [place] [address]     Dear Colleague,    Thank you for referring your patient, Gabriela Mcmahon, to the [facility] DERMATOLOGY at [facility]. Please see a copy of my visit note below.    [facility] Dermatology Note    Dermatology Problem List:  FBSE: 2/2/18,2/6/19, 2/4/2020  1.Hx moderately dysplastic nevus   - left scapula and left thigh, s/p ED&C with PCP- 1/15/2018  - left anterior thigh s/p bx 2/6/19    Encounter Date: Feb 4, 2020    CC:  Chief Complaint   Patient presents with     Skin Check     gabriela is here today for a skin check. No concerns.      History of Present Illness:  Ms. Gabriela Mcmahon is a 25 year old femalewho presents for a skin check. Last seen 2/6/2019 by Dr. Shaffer when a biopsy was taken from her left anterior thigh. Path was consistent with a junctional dysplastic nevus with mild atypia. Today, the patient would like the moles on her trunk and extremities evaluated. She regularly runs outdoors and burns easily. The patient is on Lexapro and has an IUD.      The patient otherwise denies any new or concerning lesions. No bleeding, painful, pruritic, or changing lesions. They report no personal history of skin cancer. There is a family history of skin cancer(mother and grandmothers, unknown type). No history of immunosuppression. No history of indoor tanning. They do use sunscreen and protective clothing when outdoors for sun protection. No occupational exposure to ultraviolet light or other forms of radiation. Patient is a student.  Health otherwise stable. No other skin concerns.     Past Medical History:   Patient Active Problem List   Diagnosis     Nausea vomiting and diarrhea     Past Medical History:   Diagnosis Date     Asthma      Past Surgical History:   Procedure Laterality Date     BIOPSY OF SKIN LESION       HC TOOTH EXTRACTION W/FORCEP         Social  History:  Patient reports that she has never smoked. She has never used smokeless tobacco. She reports current alcohol use. She reports current drug use. Drug: Marijuana.    Family History:  Family History   Problem Relation Age of Onset     Skin Cancer Maternal Grandmother      Skin Cancer Paternal Grandmother      Heart Defect Father      Cerebrovascular Disease Father      Melanoma No family hx of        Medications:  Current Outpatient Medications   Medication Sig Dispense Refill     albuterol (PROAIR HFA/PROVENTIL HFA/VENTOLIN HFA) 108 (90 BASE) MCG/ACT Inhaler Inhale 2 puffs into the lungs       escitalopram (LEXAPRO) 20 MG tablet Take 20 mg by mouth daily       Levonorgestrel (MIRENA, 52 MG, IU)        ondansetron (ZOFRAN-ODT) 4 MG ODT tab Take 1 tablet (4 mg) by mouth every 6 hours as needed for nausea or vomiting (Patient not taking: Reported on 2/6/2019) 10 tablet 0       Allergies   Allergen Reactions     Amoxicillin Rash       Review of Systems:  -Constitutional: Patient is otherwise feeling well, in usual state of health.   -Skin: As above in HPI. No additional skin concerns.    Physical exam:  Vitals: There were no vitals taken for this visit.  GEN: This is a well developed, well-nourished female in no acute distress, in a pleasant mood.    SKIN: Full skin, which includes the head/face, both arms, chest, back, abdomen,both legs, genitalia and/or groin buttocks, digits and/or nails, was examined.  - Cee Type I/II  - Scattered brown macules on sun exposed areas.  - 30-40 regular brown pigmented macules and papules with normal symmetric reticular network on the trunk and extremities.   - Well healed scars on the legs from previous biopsies  - No other lesions of concern on areas examined.     Impression/Plan:    1. Multiple clinically benign lesions: solar lentigines, nevi    Recommend sunscreens SPF #30 or greater, protective clothing and avoidance of tanning beds.    ABCDs of melanoma were  discussed and self skin checks were advised.     Discussed Heliocare / Heliocare Advanced as oral supplement to prevent sunburn when outdoors    Follow-up in one year for a skin check, earlier for new or changing lesions.     Staff Involved:  Scribe/Staff    Scribe Disclosure  I, Stefanie Sona, am serving as a scribe to document services personally performed by Dr. Zach Kaba MD, based on data collection and the provider's statements to me.     Provider Disclosure:   The documentation recorded by the scribe accurately reflects the services I personally performed and the decisions made by me.    Zach Kaba MD    Department of Dermatology  [facility]: Phone: [phone], Fax:[phone]  [facility]: Phone: [phone] Fax: [phone]              Again, thank you for allowing me to participate in the care of your patient.      Sincerely,    Zach Kaba MD

## 2020-02-04 NOTE — PROGRESS NOTES
[facility] Dermatology Note    Dermatology Problem List:  FBSE: 2/2/18,2/6/19, 2/4/2020  1.Hx moderately dysplastic nevus   - left scapula and left thigh, s/p ED&C with PCP- 1/15/2018  - left anterior thigh s/p bx 2/6/19    Encounter Date: Feb 4, 2020    CC:  Chief Complaint   Patient presents with     Skin Check     gabriela is here today for a skin check. No concerns.      History of Present Illness:  Ms. Gabriela Mcmahon is a 25 year old femalewho presents for a skin check. Last seen 2/6/2019 by Dr. Shaffer when a biopsy was taken from her left anterior thigh. Path was consistent with a junctional dysplastic nevus with mild atypia. Today, the patient would like the moles on her trunk and extremities evaluated. She regularly runs outdoors and burns easily. The patient is on Lexapro and has an IUD.      The patient otherwise denies any new or concerning lesions. No bleeding, painful, pruritic, or changing lesions. They report no personal history of skin cancer. There is a family history of skin cancer(mother and grandmothers, unknown type). No history of immunosuppression. No history of indoor tanning. They do use sunscreen and protective clothing when outdoors for sun protection. No occupational exposure to ultraviolet light or other forms of radiation. Patient is a student.  Health otherwise stable. No other skin concerns.     Past Medical History:   Patient Active Problem List   Diagnosis     Nausea vomiting and diarrhea     Past Medical History:   Diagnosis Date     Asthma      Past Surgical History:   Procedure Laterality Date     BIOPSY OF SKIN LESION       HC TOOTH EXTRACTION W/FORCEP         Social History:  Patient reports that she has never smoked. She has never used smokeless tobacco. She reports current alcohol use. She reports current drug use. Drug: Marijuana.    Family History:  Family History   Problem Relation Age of Onset     Skin Cancer Maternal Grandmother      Skin Cancer  Paternal Grandmother      Heart Defect Father      Cerebrovascular Disease Father      Melanoma No family hx of        Medications:  Current Outpatient Medications   Medication Sig Dispense Refill     albuterol (PROAIR HFA/PROVENTIL HFA/VENTOLIN HFA) 108 (90 BASE) MCG/ACT Inhaler Inhale 2 puffs into the lungs       escitalopram (LEXAPRO) 20 MG tablet Take 20 mg by mouth daily       Levonorgestrel (MIRENA, 52 MG, IU)        ondansetron (ZOFRAN-ODT) 4 MG ODT tab Take 1 tablet (4 mg) by mouth every 6 hours as needed for nausea or vomiting (Patient not taking: Reported on 2/6/2019) 10 tablet 0       Allergies   Allergen Reactions     Amoxicillin Rash       Review of Systems:  -Constitutional: Patient is otherwise feeling well, in usual state of health.   -Skin: As above in HPI. No additional skin concerns.    Physical exam:  Vitals: There were no vitals taken for this visit.  GEN: This is a well developed, well-nourished female in no acute distress, in a pleasant mood.    SKIN: Full skin, which includes the head/face, both arms, chest, back, abdomen,both legs, genitalia and/or groin buttocks, digits and/or nails, was examined.  - Cee Type I/II  - Scattered brown macules on sun exposed areas.  - 30-40 regular brown pigmented macules and papules with normal symmetric reticular network on the trunk and extremities.   - Well healed scars on the legs from previous biopsies  - No other lesions of concern on areas examined.     Impression/Plan:    1. Multiple clinically benign lesions: solar lentigines, nevi    Recommend sunscreens SPF #30 or greater, protective clothing and avoidance of tanning beds.    ABCDs of melanoma were discussed and self skin checks were advised.     Discussed Heliocare / Heliocare Advanced as oral supplement to prevent sunburn when outdoors    Follow-up in one year for a skin check, earlier for new or changing lesions.     Staff Involved:  Scribe/Staff    Scribe Disclosure  I, Stefanie Magallanes, am  serving as a scribe to document services personally performed by Dr. Zach Kaba MD, based on data collection and the provider's statements to me.     Provider Disclosure:   The documentation recorded by the scribe accurately reflects the services I personally performed and the decisions made by me.    Zach Kaba MD    Department of Dermatology  [facility]: Phone: [phone], Fax:[phone]  [facility]: Phone: [phone] Fax: [phone]

## 2020-02-04 NOTE — NURSING NOTE
Chief Complaint   Patient presents with     Skin Check     missael is here today for a skin check. No concerns.      Stephanie Castro LPN

## 2020-03-11 ENCOUNTER — HEALTH MAINTENANCE LETTER (OUTPATIENT)
Age: 25
End: 2020-03-11

## 2020-08-06 ENCOUNTER — APPOINTMENT (OUTPATIENT)
Dept: GENERAL RADIOLOGY | Facility: CLINIC | Age: 25
End: 2020-08-06
Attending: EMERGENCY MEDICINE
Payer: COMMERCIAL

## 2020-08-06 ENCOUNTER — HOSPITAL ENCOUNTER (EMERGENCY)
Facility: CLINIC | Age: 25
Discharge: HOME | End: 2020-08-06
Attending: EMERGENCY MEDICINE | Admitting: EMERGENCY MEDICINE
Payer: COMMERCIAL

## 2020-08-06 VITALS
BODY MASS INDEX: 25.61 KG/M2 | TEMPERATURE: 99.9 F | SYSTOLIC BLOOD PRESSURE: 114 MMHG | HEART RATE: 101 BPM | RESPIRATION RATE: 14 BRPM | WEIGHT: 150 LBS | OXYGEN SATURATION: 99 % | DIASTOLIC BLOOD PRESSURE: 77 MMHG | HEIGHT: 64 IN

## 2020-08-06 DIAGNOSIS — B34.9 VIRAL SYNDROME: ICD-10-CM

## 2020-08-06 LAB
ALBUMIN UR-MCNC: NEGATIVE MG/DL
ANION GAP SERPL CALCULATED.3IONS-SCNC: 7 MMOL/L (ref 3–14)
APPEARANCE UR: CLEAR
BACTERIA #/AREA URNS HPF: ABNORMAL /HPF
BASOPHILS # BLD AUTO: 0 10E9/L (ref 0–0.2)
BASOPHILS NFR BLD AUTO: 1.1 %
BILIRUB UR QL STRIP: NEGATIVE
BUN SERPL-MCNC: 8 MG/DL (ref 7–30)
CALCIUM SERPL-MCNC: 9 MG/DL (ref 8.5–10.1)
CHLORIDE SERPL-SCNC: 104 MMOL/L (ref 94–109)
CO2 SERPL-SCNC: 24 MMOL/L (ref 20–32)
COLOR UR AUTO: ABNORMAL
CREAT SERPL-MCNC: 1.01 MG/DL (ref 0.52–1.04)
DIFFERENTIAL METHOD BLD: ABNORMAL
EOSINOPHIL # BLD AUTO: 0.1 10E9/L (ref 0–0.7)
EOSINOPHIL NFR BLD AUTO: 3.2 %
ERYTHROCYTE [DISTWIDTH] IN BLOOD BY AUTOMATED COUNT: 11.3 % (ref 10–15)
GFR SERPL CREATININE-BSD FRML MDRD: 78 ML/MIN/{1.73_M2}
GLUCOSE SERPL-MCNC: 75 MG/DL (ref 70–99)
GLUCOSE UR STRIP-MCNC: NEGATIVE MG/DL
HCT VFR BLD AUTO: 46.6 % (ref 35–47)
HGB BLD-MCNC: 15.2 G/DL (ref 11.7–15.7)
HGB UR QL STRIP: NEGATIVE
HYALINE CASTS #/AREA URNS LPF: 1 /LPF (ref 0–2)
IMM GRANULOCYTES # BLD: 0.1 10E9/L (ref 0–0.4)
IMM GRANULOCYTES NFR BLD: 2.1 %
KETONES UR STRIP-MCNC: 10 MG/DL
LACTATE BLD-SCNC: 1.4 MMOL/L (ref 0.7–2)
LEUKOCYTE ESTERASE UR QL STRIP: NEGATIVE
LYMPHOCYTES # BLD AUTO: 0.4 10E9/L (ref 0.8–5.3)
LYMPHOCYTES NFR BLD AUTO: 14.4 %
MCH RBC QN AUTO: 31.5 PG (ref 26.5–33)
MCHC RBC AUTO-ENTMCNC: 32.6 G/DL (ref 31.5–36.5)
MCV RBC AUTO: 97 FL (ref 78–100)
MONOCYTES # BLD AUTO: 0.4 10E9/L (ref 0–1.3)
MONOCYTES NFR BLD AUTO: 12.3 %
MUCOUS THREADS #/AREA URNS LPF: PRESENT /LPF
NEUTROPHILS # BLD AUTO: 1.9 10E9/L (ref 1.6–8.3)
NEUTROPHILS NFR BLD AUTO: 66.9 %
NITRATE UR QL: NEGATIVE
NRBC # BLD AUTO: 0 10*3/UL
NRBC BLD AUTO-RTO: 0 /100
PH UR STRIP: 6 PH (ref 5–7)
PLATELET # BLD AUTO: 175 10E9/L (ref 150–450)
POTASSIUM SERPL-SCNC: 3.6 MMOL/L (ref 3.4–5.3)
RBC # BLD AUTO: 4.82 10E12/L (ref 3.8–5.2)
RBC #/AREA URNS AUTO: 1 /HPF (ref 0–2)
SODIUM SERPL-SCNC: 135 MMOL/L (ref 133–144)
SOURCE: ABNORMAL
SP GR UR STRIP: 1 (ref 1–1.03)
SQUAMOUS #/AREA URNS AUTO: 1 /HPF (ref 0–1)
UROBILINOGEN UR STRIP-MCNC: NORMAL MG/DL (ref 0–2)
WBC # BLD AUTO: 2.8 10E9/L (ref 4–11)
WBC #/AREA URNS AUTO: <1 /HPF (ref 0–5)

## 2020-08-06 PROCEDURE — 99283 EMERGENCY DEPT VISIT LOW MDM: CPT | Mod: Z6 | Performed by: EMERGENCY MEDICINE

## 2020-08-06 PROCEDURE — 80048 BASIC METABOLIC PNL TOTAL CA: CPT | Performed by: EMERGENCY MEDICINE

## 2020-08-06 PROCEDURE — 85025 COMPLETE CBC W/AUTO DIFF WBC: CPT | Performed by: EMERGENCY MEDICINE

## 2020-08-06 PROCEDURE — 71045 X-RAY EXAM CHEST 1 VIEW: CPT

## 2020-08-06 PROCEDURE — 25800030 ZZH RX IP 258 OP 636: Performed by: EMERGENCY MEDICINE

## 2020-08-06 PROCEDURE — U0003 INFECTIOUS AGENT DETECTION BY NUCLEIC ACID (DNA OR RNA); SEVERE ACUTE RESPIRATORY SYNDROME CORONAVIRUS 2 (SARS-COV-2) (CORONAVIRUS DISEASE [COVID-19]), AMPLIFIED PROBE TECHNIQUE, MAKING USE OF HIGH THROUGHPUT TECHNOLOGIES AS DESCRIBED BY CMS-2020-01-R: HCPCS | Performed by: EMERGENCY MEDICINE

## 2020-08-06 PROCEDURE — 83605 ASSAY OF LACTIC ACID: CPT | Performed by: EMERGENCY MEDICINE

## 2020-08-06 PROCEDURE — C9803 HOPD COVID-19 SPEC COLLECT: HCPCS | Performed by: EMERGENCY MEDICINE

## 2020-08-06 PROCEDURE — 81001 URINALYSIS AUTO W/SCOPE: CPT | Performed by: EMERGENCY MEDICINE

## 2020-08-06 PROCEDURE — 87040 BLOOD CULTURE FOR BACTERIA: CPT | Performed by: EMERGENCY MEDICINE

## 2020-08-06 PROCEDURE — 96360 HYDRATION IV INFUSION INIT: CPT | Performed by: EMERGENCY MEDICINE

## 2020-08-06 PROCEDURE — 99283 EMERGENCY DEPT VISIT LOW MDM: CPT | Mod: 25 | Performed by: EMERGENCY MEDICINE

## 2020-08-06 RX ADMIN — SODIUM CHLORIDE 1000 ML: 9 INJECTION, SOLUTION INTRAVENOUS at 14:11

## 2020-08-06 ASSESSMENT — ENCOUNTER SYMPTOMS
COUGH: 0
FEVER: 1
VOMITING: 0
APPETITE CHANGE: 0
NAUSEA: 1
BACK PAIN: 1
MYALGIAS: 1
SORE THROAT: 0
WEAKNESS: 1

## 2020-08-06 ASSESSMENT — MIFFLIN-ST. JEOR: SCORE: 1415.4

## 2020-08-06 NOTE — ED AVS SNAPSHOT
[facility] [place], Emergency Department  [address] [address]  Phone:  [phone]                                    Gabriela Mcmahon   MRN: [id number]    Department:  [facility], Emergency Department   Date of Visit:  8/6/2020           After Visit Summary Signature Page    I have received my discharge instructions, and my questions have been answered. I have discussed any challenges I see with this plan with the nurse or doctor.    ..........................................................................................................................................  Patient/Patient Representative Signature      ..........................................................................................................................................  Patient Representative Print Name and Relationship to Patient    ..................................................               ................................................  Date                                   Time    ..........................................................................................................................................  Reviewed by Signature/Title    ...................................................              ..............................................  Date                                               Time          22EPIC Rev 08/18

## 2020-08-06 NOTE — ED NOTES
Initial Assessment: VSS. Communicates needs without difficulty. Pleasant and co-op. Denies SOB. Denies chest/shoulder/arm pain or discomfort.

## 2020-08-06 NOTE — DISCHARGE INSTRUCTIONS
Thank you for coming to the [facility] Emergency Department.     Please follow up with your primary care clinic next week to schedule a repeat check of your lymphocyte count.     COVID testing is in process. Expect a call from the hospital in the next 24-48 hours if the test result is +. If negative, the result will be posted in [facility], and you will receive a letter in the mail. Please self-quarantine away from others until the result is negative.     How can I take care of myself at home?  Get lots of rest. Drink extra fluids (unless a doctor has told you not to).  Take Tylenol (acetaminophen) for fever or pain. If you have liver or kidney problems, ask your family doctor if it's okay to take Tylenol.     Adults can take either:  650 mg (two 325 mg pills) every 4 to 6 hours, or   1,000 mg (two 500 mg pills) every 8 hours as needed.  Note: Don't take more than 3,000 mg in one day. Acetaminophen is found in many medicines (both prescribed and over-the-counter medicines). Read all labels to be sure you don't take too much.   For children, check the Tylenol bottle for the right dose. The dose is based on the child's age or weight.  If you have other health problems (like cancer, heart failure, an organ transplant or severe kidney disease): Call your specialty clinic if you don't feel better in the next 2 days.  Know when to call 911. Emergency warning signs include:  Trouble breathing or shortness of breath  Chest pain or pressure that doesn't go away  Feeling confused like you haven't felt before, or not being able to wake up  Bluish-colored lips or face    Where can I get more information?   [facility] - About COVID-19:   [email or web address]  CDC - If You're Sick: cdc.gov/coronavirus/2019-ncov/about/steps-when-sick.html  CDC - Ending Home Isolation: www.cdc.gov/coronavirus/2019-ncov/hcp/disposition-in-home-patients.html  CDC - Caring for Someone:  www.cdc.gov/coronavirus/2019-ncov/if-you-are-sick/care-for-someone.html  [facility] - Interim Guidance for Hospital Discharge to Home: www.health.[facility]/diseases/coronavirus/hcp/hospdischarge.pdf  [facility] clinical trials (COVID-19 research studies): clinicalaffairs.[facility]-clinical-trials  Below are the COVID-19 hotlines at the [place] Department of Health ([facility]). Interpreters are available.  For health questions: Call 553-124-9660 or 1-563.577.5200 (7 a.m. to 7 p.m.)  For questions about schools and childcare: Call 981-520-2675 or 1-173.494.7664 (7 a.m. to 7 p.m.)

## 2020-08-06 NOTE — ED PROVIDER NOTES
ED Provider Note  [facility] EMERGENCY DEPARTMENT ([facility])  August 6, 2020    History     Chief Complaint   Patient presents with     Generalized Body Aches     Fever     HPI  Gabriela Mcmahon is a 25 year old female with a past medical history significant for s/p cyst removal (8/1/2020) who presents to the Emergency Department for evaluation of generalized myalgias, fever and nausea. Patient reports that she had a cyst removed on 8/1/2020 and was not experiencing any adverse symptoms until yesterday, when she measured a fever of 101.8 and felt generalized myalgias with associated nausea and weakness. Notes that her lower back is particularly painful. Denies sore throat or cough, denies vomiting. Denies decrease in appetite. Denies taking a COVID-19 test, is unsure if she was exposed.    Per chart review patient presented to [facility] on 8/1/2020 or worsening symptoms of a cyst on her left inner thigh.  Patient reports she had cyst removed on the same day, was prescribed Bactrim DS and told that she developed a fever and/or dizziness to be seen.  Patient presented to [facility] yesterday afternoon complaining of back pain body aches and a fever that developed at work, temperature 101.8  F, took Tylenol.  Complained of headache and dizziness, rated back pain an 8 out of 10, denied pain when she urinates, has been pushing fluids.  Area where cyst was removed was bruised no redness.      Past Medical History  Past Medical History:   Diagnosis Date     Asthma      Past Surgical History:   Procedure Laterality Date     BIOPSY OF SKIN LESION       HC TOOTH EXTRACTION W/FORCEP       albuterol (PROAIR HFA/PROVENTIL HFA/VENTOLIN HFA) 108 (90 BASE) MCG/ACT Inhaler  escitalopram (LEXAPRO) 20 MG tablet  Levonorgestrel (MIRENA, 52 MG, IU)  ondansetron (ZOFRAN-ODT) 4 MG ODT tab      Allergies   Allergen Reactions     Amoxicillin Rash     Past medical history, past  "surgical history, medications, and allergies were reviewed with the patient. Additional pertinent items: None    Family History  Family History   Problem Relation Age of Onset     Skin Cancer Maternal Grandmother      Skin Cancer Paternal Grandmother      Heart Defect Father      Cerebrovascular Disease Father      Melanoma No family hx of      Family history was reviewed with the patient. Additional pertinent items: None    Social History  Social History     Tobacco Use     Smoking status: Never Smoker     Smokeless tobacco: Never Used   Substance Use Topics     Alcohol use: Yes     Comment: occasional     Drug use: Yes     Frequency: 1.0 times per week     Types: Marijuana      Social history was reviewed with the patient. Additional pertinent items: None    Review of Systems   Constitutional: Positive for fever. Negative for appetite change.   HENT: Negative for sore throat.    Respiratory: Negative for cough.    Gastrointestinal: Positive for nausea. Negative for vomiting.   Musculoskeletal: Positive for back pain and myalgias.   Neurological: Positive for weakness.   All other systems reviewed and are negative.    A complete review of systems was performed with pertinent positives and negatives noted in the HPI, and all other systems negative.    Physical Exam   BP: 120/72  Pulse: 91  Heart Rate: 99  Temp: 99.1  F (37.3  C)  Resp: 16  Height: 162.6 cm (5' 4\")  Weight: 68 kg (150 lb)  SpO2: 99 %  Physical Exam  Gen:A&Ox3, no acute distress  HEENT:PERRL, no facial tenderness or wounds, head atraumatic, oropharynx clear, mucous membranes moist, TMs clear bilaterally  Neck:no bony tenderness or step offs, no JVD, trachea midline  Back: no CVA tenderness, no midline bony tenderness, back discomfort of upper perispinal muscles  CV:RRR without murmurs  PULM:Clear to auscultation bilaterally  Abd:soft, nontender, nondistended. Bowel sounds present and normal  UE:No traumatic injuries, skin normal, normal " perfusion  LE:no traumatic injuries, skin normal, no LE edema. Left thigh incision healing well. Clean, dry, and without drainage  Neuro:CN II-XII intact, strength 5/5 throughout, gait stable, coordination normal, no meningismus  Skin: no rashes or ecchymoses    ED Course     12:34 PM  The patient was seen and examined by Taylor Alex MD in Room ED11.    Procedures           Results for orders placed or performed during the hospital encounter of 08/06/20   XR Chest Port 1 View     Status: None    Narrative    Portable chest    INDICATION: Fever    COMPARISON: None    FINDINGS: Heart size and shape appear normal. The lungs, pulmonary  vascularity and bony structures appear intact.      Impression    IMPRESSION: Negative    LESLYE MORRELL MD   CBC with platelets differential     Status: Abnormal   Result Value Ref Range    WBC 2.8 (L) 4.0 - 11.0 10e9/L    RBC Count 4.82 3.8 - 5.2 10e12/L    Hemoglobin 15.2 11.7 - 15.7 g/dL    Hematocrit 46.6 35.0 - 47.0 %    MCV 97 78 - 100 fl    MCH 31.5 26.5 - 33.0 pg    MCHC 32.6 31.5 - 36.5 g/dL    RDW 11.3 10.0 - 15.0 %    Platelet Count 175 150 - 450 10e9/L    Diff Method Automated Method     % Neutrophils 66.9 %    % Lymphocytes 14.4 %    % Monocytes 12.3 %    % Eosinophils 3.2 %    % Basophils 1.1 %    % Immature Granulocytes 2.1 %    Nucleated RBCs 0 0 /100    Absolute Neutrophil 1.9 1.6 - 8.3 10e9/L    Absolute Lymphocytes 0.4 (L) 0.8 - 5.3 10e9/L    Absolute Monocytes 0.4 0.0 - 1.3 10e9/L    Absolute Eosinophils 0.1 0.0 - 0.7 10e9/L    Absolute Basophils 0.0 0.0 - 0.2 10e9/L    Abs Immature Granulocytes 0.1 0 - 0.4 10e9/L    Absolute Nucleated RBC 0.0    Basic metabolic panel     Status: None   Result Value Ref Range    Sodium 135 133 - 144 mmol/L    Potassium 3.6 3.4 - 5.3 mmol/L    Chloride 104 94 - 109 mmol/L    Carbon Dioxide 24 20 - 32 mmol/L    Anion Gap 7 3 - 14 mmol/L    Glucose 75 70 - 99 mg/dL    Urea Nitrogen 8 7 - 30 mg/dL    Creatinine 1.01 0.52 - 1.04  mg/dL    GFR Estimate 78 >60 mL/min/[1.73_m2]    GFR Estimate If Black 90 >60 mL/min/[1.73_m2]    Calcium 9.0 8.5 - 10.1 mg/dL   UA with Microscopic reflex to Culture     Status: Abnormal    Specimen: Urine Midstream; Midstream Urine   Result Value Ref Range    Color Urine Light Yellow     Appearance Urine Clear     Glucose Urine Negative NEG^Negative mg/dL    Bilirubin Urine Negative NEG^Negative    Ketones Urine 10 (A) NEG^Negative mg/dL    Specific Gravity Urine 1.004 1.003 - 1.035    Blood Urine Negative NEG^Negative    pH Urine 6.0 5.0 - 7.0 pH    Protein Albumin Urine Negative NEG^Negative mg/dL    Urobilinogen mg/dL Normal 0.0 - 2.0 mg/dL    Nitrite Urine Negative NEG^Negative    Leukocyte Esterase Urine Negative NEG^Negative    Source Midstream Urine     WBC Urine <1 0 - 5 /HPF    RBC Urine 1 0 - 2 /HPF    Bacteria Urine Few (A) NEG^Negative /HPF    Squamous Epithelial /HPF Urine 1 0 - 1 /HPF    Mucous Urine Present (A) NEG^Negative /LPF    Hyaline Casts 1 0 - 2 /LPF   Lactic acid     Status: None   Result Value Ref Range    Lactic Acid 1.4 0.7 - 2.0 mmol/L   Blood culture     Status: None (Preliminary result)    Specimen: Arm, Right; Blood    Right Arm   Result Value Ref Range    Specimen Description Blood Right Arm     Culture Micro No growth after 13 hours    Blood culture     Status: None (Preliminary result)    Specimen: venous blood    Venipuncture Collection VPT~Unspecified Site   Result Value Ref Range    Specimen Description Blood Venipuncture Collection VPT Unspecified Site     Culture Micro No growth after 13 hours      Medications   0.9% sodium chloride BOLUS (0 mLs Intravenous Stopped 8/6/20 1513)        Assessments & Plan (with Medical Decision Making)   26 yo F presenting with viral syndrome of HA, fever, myalgias, dizziness. No associated sore throat or cough. No cervical lymphadenopathy. No meningismus or other focal neurologic findings.  DDx also included UTI or pyelonephritis, pneumonia.    COVID-19 testing in process. No signs of severe illness at this time. Pt and I reviewed home isolation and care.   IV access obtained and lab testing done.   Blood culture x2 sent due to recent skin/soft tissue procedure. Local site without signs of infection.   CBC with low WBC, specifically with lymphopenia, again suggestive of viral syndrome. COVID. Pt was HIV negative 1/2020. Recommended recheck in the next week with PCP, and additional work up if not recovered.   BMP unremarkable.   UA clean.  CXR without infiltrates.   Treated with 1L 0.9NS.  Discharged home with supportive care and follow up next week with PCP.       I have reviewed the nursing notes. I have reviewed the findings, diagnosis, plan and need for follow up with the patient.    Discharge Medication List as of 8/6/2020  3:40 PM          Final diagnoses:   Viral syndrome     I, Nani Dial, am serving as a trained medical scribe to document services personally performed by Taylor Alex MD, based on the provider's statements to me.   ITaylor MD, was physically present and have reviewed and verified the accuracy of this note documented by Nani Dial.    Emergency Medicine   [facility], EMERGENCY DEPARTMENT  8/6/2020    MD KAROLYN Michelle Katrina Anne, MD  08/07/20 0961

## 2020-08-07 ENCOUNTER — HOSPITAL ENCOUNTER (OUTPATIENT)
Facility: CLINIC | Age: 25
Setting detail: OBSERVATION
Discharge: HOME | End: 2020-08-08
Attending: INTERNAL MEDICINE | Admitting: FAMILY MEDICINE
Payer: COMMERCIAL

## 2020-08-07 DIAGNOSIS — D72.819 LEUKOPENIA, UNSPECIFIED TYPE: ICD-10-CM

## 2020-08-07 DIAGNOSIS — T78.40XA ALLERGIC REACTION, INITIAL ENCOUNTER: Primary | ICD-10-CM

## 2020-08-07 DIAGNOSIS — R50.9 FEVER AND CHILLS: ICD-10-CM

## 2020-08-07 DIAGNOSIS — L27.0 ALLERGIC DRUG RASH: ICD-10-CM

## 2020-08-07 LAB
ALBUMIN SERPL-MCNC: 3.6 G/DL (ref 3.4–5)
ALBUMIN UR-MCNC: NEGATIVE MG/DL
ALP SERPL-CCNC: 55 U/L (ref 40–150)
ALT SERPL W P-5'-P-CCNC: 22 U/L (ref 0–50)
ANION GAP SERPL CALCULATED.3IONS-SCNC: 5 MMOL/L (ref 3–14)
APPEARANCE UR: CLEAR
AST SERPL W P-5'-P-CCNC: 26 U/L (ref 0–45)
BACTERIA #/AREA URNS HPF: ABNORMAL /HPF
BASOPHILS # BLD AUTO: 0 10E9/L (ref 0–0.2)
BASOPHILS NFR BLD AUTO: 0 %
BILIRUB SERPL-MCNC: 0.2 MG/DL (ref 0.2–1.3)
BILIRUB UR QL STRIP: NEGATIVE
BUN SERPL-MCNC: 6 MG/DL (ref 7–30)
CALCIUM SERPL-MCNC: 8.5 MG/DL (ref 8.5–10.1)
CHLORIDE SERPL-SCNC: 106 MMOL/L (ref 94–109)
CO2 SERPL-SCNC: 24 MMOL/L (ref 20–32)
COLOR UR AUTO: ABNORMAL
CREAT SERPL-MCNC: 0.89 MG/DL (ref 0.52–1.04)
CRP SERPL-MCNC: 30 MG/L (ref 0–8)
DIFFERENTIAL METHOD BLD: ABNORMAL
EOSINOPHIL # BLD AUTO: 0.3 10E9/L (ref 0–0.7)
EOSINOPHIL NFR BLD AUTO: 12.7 %
ERYTHROCYTE [DISTWIDTH] IN BLOOD BY AUTOMATED COUNT: 11.5 % (ref 10–15)
GFR SERPL CREATININE-BSD FRML MDRD: >90 ML/MIN/{1.73_M2}
GLUCOSE SERPL-MCNC: 87 MG/DL (ref 70–99)
GLUCOSE UR STRIP-MCNC: NEGATIVE MG/DL
HCT VFR BLD AUTO: 44.1 % (ref 35–47)
HETEROPH AB SER QL: NEGATIVE
HGB BLD-MCNC: 14.3 G/DL (ref 11.7–15.7)
HGB UR QL STRIP: ABNORMAL
KETONES UR STRIP-MCNC: NEGATIVE MG/DL
LEUKOCYTE ESTERASE UR QL STRIP: NEGATIVE
LYMPHOCYTES # BLD AUTO: 0.7 10E9/L (ref 0.8–5.3)
LYMPHOCYTES NFR BLD AUTO: 33.9 %
MCH RBC QN AUTO: 31.3 PG (ref 26.5–33)
MCHC RBC AUTO-ENTMCNC: 32.4 G/DL (ref 31.5–36.5)
MCV RBC AUTO: 97 FL (ref 78–100)
MONOCYTES # BLD AUTO: 0.1 10E9/L (ref 0–1.3)
MONOCYTES NFR BLD AUTO: 5.1 %
MYELOCYTES # BLD: 0.1 10E9/L
MYELOCYTES NFR BLD MANUAL: 5.1 %
NEUTROPHILS # BLD AUTO: 0.9 10E9/L (ref 1.6–8.3)
NEUTROPHILS NFR BLD AUTO: 43.2 %
NITRATE UR QL: NEGATIVE
PH UR STRIP: 6 PH (ref 5–7)
PLATELET # BLD AUTO: 122 10E9/L (ref 150–450)
PLATELET # BLD EST: ABNORMAL 10*3/UL
POTASSIUM SERPL-SCNC: 3.7 MMOL/L (ref 3.4–5.3)
PROT SERPL-MCNC: 7.6 G/DL (ref 6.8–8.8)
RBC # BLD AUTO: 4.57 10E12/L (ref 3.8–5.2)
RBC #/AREA URNS AUTO: <1 /HPF (ref 0–2)
RBC MORPH BLD: NORMAL
SARS-COV-2 RNA SPEC QL NAA+PROBE: NOT DETECTED
SODIUM SERPL-SCNC: 135 MMOL/L (ref 133–144)
SOURCE: ABNORMAL
SP GR UR STRIP: 1 (ref 1–1.03)
SPECIMEN SOURCE: NORMAL
SPERM #/AREA URNS HPF: PRESENT /HPF
SQUAMOUS #/AREA URNS AUTO: 5 /HPF (ref 0–1)
UROBILINOGEN UR STRIP-MCNC: NORMAL MG/DL (ref 0–2)
WBC # BLD AUTO: 2 10E9/L (ref 4–11)
WBC #/AREA URNS AUTO: 0 /HPF (ref 0–5)

## 2020-08-07 PROCEDURE — 96375 TX/PRO/DX INJ NEW DRUG ADDON: CPT | Performed by: INTERNAL MEDICINE

## 2020-08-07 PROCEDURE — 86308 HETEROPHILE ANTIBODY SCREEN: CPT | Performed by: INTERNAL MEDICINE

## 2020-08-07 PROCEDURE — 99285 EMERGENCY DEPT VISIT HI MDM: CPT | Performed by: INTERNAL MEDICINE

## 2020-08-07 PROCEDURE — 96374 THER/PROPH/DIAG INJ IV PUSH: CPT | Performed by: INTERNAL MEDICINE

## 2020-08-07 PROCEDURE — 86140 C-REACTIVE PROTEIN: CPT | Performed by: INTERNAL MEDICINE

## 2020-08-07 PROCEDURE — 25000128 H RX IP 250 OP 636: Performed by: INTERNAL MEDICINE

## 2020-08-07 PROCEDURE — 81001 URINALYSIS AUTO W/SCOPE: CPT | Performed by: INTERNAL MEDICINE

## 2020-08-07 PROCEDURE — 80053 COMPREHEN METABOLIC PANEL: CPT | Performed by: INTERNAL MEDICINE

## 2020-08-07 PROCEDURE — 99220 ZZC INITIAL OBSERVATION CARE,LEVL III: CPT | Mod: Z6 | Performed by: PHYSICIAN ASSISTANT

## 2020-08-07 PROCEDURE — 85025 COMPLETE CBC W/AUTO DIFF WBC: CPT | Performed by: INTERNAL MEDICINE

## 2020-08-07 RX ORDER — LIDOCAINE 40 MG/G
CREAM TOPICAL
Status: DISCONTINUED | OUTPATIENT
Start: 2020-08-07 | End: 2020-08-08 | Stop reason: HOSPADM

## 2020-08-07 RX ORDER — ACETAMINOPHEN 500 MG
1000 TABLET ORAL EVERY 6 HOURS PRN
Status: DISCONTINUED | OUTPATIENT
Start: 2020-08-07 | End: 2020-08-08 | Stop reason: HOSPADM

## 2020-08-07 RX ORDER — METHYLPREDNISOLONE SODIUM SUCCINATE 125 MG/2ML
60 INJECTION, POWDER, LYOPHILIZED, FOR SOLUTION INTRAMUSCULAR; INTRAVENOUS EVERY 6 HOURS
Status: DISCONTINUED | OUTPATIENT
Start: 2020-08-08 | End: 2020-08-08

## 2020-08-07 RX ORDER — SODIUM CHLORIDE 9 MG/ML
INJECTION, SOLUTION INTRAVENOUS CONTINUOUS
Status: DISCONTINUED | OUTPATIENT
Start: 2020-08-07 | End: 2020-08-08 | Stop reason: HOSPADM

## 2020-08-07 RX ORDER — ONDANSETRON 4 MG/1
4 TABLET, ORALLY DISINTEGRATING ORAL EVERY 6 HOURS PRN
Status: DISCONTINUED | OUTPATIENT
Start: 2020-08-07 | End: 2020-08-08 | Stop reason: HOSPADM

## 2020-08-07 RX ORDER — DIPHENHYDRAMINE HYDROCHLORIDE 50 MG/ML
25 INJECTION INTRAMUSCULAR; INTRAVENOUS EVERY 6 HOURS
Status: DISCONTINUED | OUTPATIENT
Start: 2020-08-08 | End: 2020-08-08

## 2020-08-07 RX ORDER — ACETAMINOPHEN 650 MG/1
650 SUPPOSITORY RECTAL EVERY 4 HOURS PRN
Status: DISCONTINUED | OUTPATIENT
Start: 2020-08-07 | End: 2020-08-08 | Stop reason: HOSPADM

## 2020-08-07 RX ORDER — METHYLPREDNISOLONE SODIUM SUCCINATE 125 MG/2ML
80 INJECTION, POWDER, LYOPHILIZED, FOR SOLUTION INTRAMUSCULAR; INTRAVENOUS ONCE
Status: COMPLETED | OUTPATIENT
Start: 2020-08-07 | End: 2020-08-07

## 2020-08-07 RX ORDER — CETIRIZINE HYDROCHLORIDE 10 MG/1
10 TABLET ORAL AT BEDTIME
Status: DISCONTINUED | OUTPATIENT
Start: 2020-08-07 | End: 2020-08-08 | Stop reason: HOSPADM

## 2020-08-07 RX ORDER — ALBUTEROL SULFATE 0.83 MG/ML
2.5 SOLUTION RESPIRATORY (INHALATION)
Status: DISCONTINUED | OUTPATIENT
Start: 2020-08-07 | End: 2020-08-08 | Stop reason: HOSPADM

## 2020-08-07 RX ORDER — ONDANSETRON 2 MG/ML
4 INJECTION INTRAMUSCULAR; INTRAVENOUS EVERY 6 HOURS PRN
Status: DISCONTINUED | OUTPATIENT
Start: 2020-08-07 | End: 2020-08-08 | Stop reason: HOSPADM

## 2020-08-07 RX ORDER — DIPHENHYDRAMINE HYDROCHLORIDE 50 MG/ML
25 INJECTION INTRAMUSCULAR; INTRAVENOUS ONCE
Status: COMPLETED | OUTPATIENT
Start: 2020-08-07 | End: 2020-08-07

## 2020-08-07 RX ORDER — EPINEPHRINE 0.3 MG/.3ML
0.3 INJECTION SUBCUTANEOUS
Status: DISCONTINUED | OUTPATIENT
Start: 2020-08-07 | End: 2020-08-08 | Stop reason: HOSPADM

## 2020-08-07 RX ADMIN — DIPHENHYDRAMINE HYDROCHLORIDE 25 MG: 50 INJECTION, SOLUTION INTRAMUSCULAR; INTRAVENOUS at 20:38

## 2020-08-07 RX ADMIN — METHYLPREDNISOLONE SODIUM SUCCINATE 81.25 MG: 125 INJECTION, POWDER, FOR SOLUTION INTRAMUSCULAR; INTRAVENOUS at 22:22

## 2020-08-07 ASSESSMENT — ENCOUNTER SYMPTOMS
ADENOPATHY: 0
NAUSEA: 0
NECK PAIN: 0
CHILLS: 0
DIFFICULTY URINATING: 0
WHEEZING: 0
COUGH: 0
FATIGUE: 1
WEAKNESS: 0
LIGHT-HEADEDNESS: 0
ABDOMINAL PAIN: 0
HEADACHES: 0
EYE REDNESS: 0
SHORTNESS OF BREATH: 0
FEVER: 1
CONFUSION: 0
NUMBNESS: 0
BACK PAIN: 0

## 2020-08-07 ASSESSMENT — MIFFLIN-ST. JEOR: SCORE: 1415.4

## 2020-08-08 VITALS
TEMPERATURE: 98.5 F | DIASTOLIC BLOOD PRESSURE: 58 MMHG | SYSTOLIC BLOOD PRESSURE: 100 MMHG | BODY MASS INDEX: 26.17 KG/M2 | HEIGHT: 64 IN | HEART RATE: 63 BPM | OXYGEN SATURATION: 96 % | RESPIRATION RATE: 18 BRPM | WEIGHT: 153.3 LBS

## 2020-08-08 LAB
ALBUMIN SERPL-MCNC: 3.4 G/DL (ref 3.4–5)
ALP SERPL-CCNC: 52 U/L (ref 40–150)
ALT SERPL W P-5'-P-CCNC: 23 U/L (ref 0–50)
ANION GAP SERPL CALCULATED.3IONS-SCNC: 6 MMOL/L (ref 3–14)
ANION GAP SERPL CALCULATED.3IONS-SCNC: 6 MMOL/L (ref 3–14)
AST SERPL W P-5'-P-CCNC: 26 U/L (ref 0–45)
BASOPHILS # BLD AUTO: 0 10E9/L (ref 0–0.2)
BASOPHILS # BLD AUTO: 0 10E9/L (ref 0–0.2)
BASOPHILS NFR BLD AUTO: 0 %
BASOPHILS NFR BLD AUTO: 0.4 %
BILIRUB SERPL-MCNC: 0.2 MG/DL (ref 0.2–1.3)
BUN SERPL-MCNC: 4 MG/DL (ref 7–30)
BUN SERPL-MCNC: 4 MG/DL (ref 7–30)
CALCIUM SERPL-MCNC: 8.5 MG/DL (ref 8.5–10.1)
CALCIUM SERPL-MCNC: 8.6 MG/DL (ref 8.5–10.1)
CHLORIDE SERPL-SCNC: 107 MMOL/L (ref 94–109)
CHLORIDE SERPL-SCNC: 107 MMOL/L (ref 94–109)
CO2 SERPL-SCNC: 24 MMOL/L (ref 20–32)
CO2 SERPL-SCNC: 25 MMOL/L (ref 20–32)
CREAT SERPL-MCNC: 0.71 MG/DL (ref 0.52–1.04)
CREAT SERPL-MCNC: 0.74 MG/DL (ref 0.52–1.04)
CRP SERPL-MCNC: 27 MG/L (ref 0–8)
DIFFERENTIAL METHOD BLD: ABNORMAL
DIFFERENTIAL METHOD BLD: ABNORMAL
EOSINOPHIL # BLD AUTO: 0 10E9/L (ref 0–0.7)
EOSINOPHIL # BLD AUTO: 0 10E9/L (ref 0–0.7)
EOSINOPHIL NFR BLD AUTO: 0.8 %
EOSINOPHIL NFR BLD AUTO: 0.9 %
ERYTHROCYTE [DISTWIDTH] IN BLOOD BY AUTOMATED COUNT: 11.3 % (ref 10–15)
ERYTHROCYTE [DISTWIDTH] IN BLOOD BY AUTOMATED COUNT: 11.5 % (ref 10–15)
GFR SERPL CREATININE-BSD FRML MDRD: >90 ML/MIN/{1.73_M2}
GFR SERPL CREATININE-BSD FRML MDRD: >90 ML/MIN/{1.73_M2}
GLUCOSE SERPL-MCNC: 142 MG/DL (ref 70–99)
GLUCOSE SERPL-MCNC: 147 MG/DL (ref 70–99)
HCT VFR BLD AUTO: 41.6 % (ref 35–47)
HCT VFR BLD AUTO: 42.4 % (ref 35–47)
HGB BLD-MCNC: 13.6 G/DL (ref 11.7–15.7)
HGB BLD-MCNC: 13.9 G/DL (ref 11.7–15.7)
IMM GRANULOCYTES # BLD: 0 10E9/L (ref 0–0.4)
IMM GRANULOCYTES NFR BLD: 1.6 %
LYMPHOCYTES # BLD AUTO: 0.1 10E9/L (ref 0.8–5.3)
LYMPHOCYTES # BLD AUTO: 0.5 10E9/L (ref 0.8–5.3)
LYMPHOCYTES NFR BLD AUTO: 18.3 %
LYMPHOCYTES NFR BLD AUTO: 6.3 %
MCH RBC QN AUTO: 31.3 PG (ref 26.5–33)
MCH RBC QN AUTO: 31.7 PG (ref 26.5–33)
MCHC RBC AUTO-ENTMCNC: 32.7 G/DL (ref 31.5–36.5)
MCHC RBC AUTO-ENTMCNC: 32.8 G/DL (ref 31.5–36.5)
MCV RBC AUTO: 96 FL (ref 78–100)
MCV RBC AUTO: 97 FL (ref 78–100)
MONOCYTES # BLD AUTO: 0 10E9/L (ref 0–1.3)
MONOCYTES # BLD AUTO: 0.1 10E9/L (ref 0–1.3)
MONOCYTES NFR BLD AUTO: 0 %
MONOCYTES NFR BLD AUTO: 4.1 %
MYELOCYTES # BLD: 0 10E9/L
MYELOCYTES NFR BLD MANUAL: 0.9 %
NEUTROPHILS # BLD AUTO: 1.8 10E9/L (ref 1.6–8.3)
NEUTROPHILS # BLD AUTO: 2.1 10E9/L (ref 1.6–8.3)
NEUTROPHILS NFR BLD AUTO: 74.8 %
NEUTROPHILS NFR BLD AUTO: 91.9 %
NRBC # BLD AUTO: 0 10*3/UL
NRBC BLD AUTO-RTO: 0 /100
PLATELET # BLD AUTO: 107 10E9/L (ref 150–450)
PLATELET # BLD AUTO: 113 10E9/L (ref 150–450)
PLATELET # BLD EST: ABNORMAL 10*3/UL
PLATELET # BLD EST: ABNORMAL 10*3/UL
POTASSIUM SERPL-SCNC: 3.8 MMOL/L (ref 3.4–5.3)
POTASSIUM SERPL-SCNC: 4 MMOL/L (ref 3.4–5.3)
PROT SERPL-MCNC: 7.3 G/DL (ref 6.8–8.8)
RBC # BLD AUTO: 4.34 10E12/L (ref 3.8–5.2)
RBC # BLD AUTO: 4.38 10E12/L (ref 3.8–5.2)
RBC MORPH BLD: NORMAL
SODIUM SERPL-SCNC: 137 MMOL/L (ref 133–144)
SODIUM SERPL-SCNC: 138 MMOL/L (ref 133–144)
WBC # BLD AUTO: 2.3 10E9/L (ref 4–11)
WBC # BLD AUTO: 2.5 10E9/L (ref 4–11)

## 2020-08-08 PROCEDURE — 99217 ZZC OBSERVATION CARE DISCHARGE: CPT | Mod: Z6 | Performed by: FAMILY MEDICINE

## 2020-08-08 PROCEDURE — 25800030 ZZH RX IP 258 OP 636: Performed by: PHYSICIAN ASSISTANT

## 2020-08-08 PROCEDURE — 96375 TX/PRO/DX INJ NEW DRUG ADDON: CPT

## 2020-08-08 PROCEDURE — 85025 COMPLETE CBC W/AUTO DIFF WBC: CPT | Performed by: PHYSICIAN ASSISTANT

## 2020-08-08 PROCEDURE — 80053 COMPREHEN METABOLIC PANEL: CPT | Performed by: NURSE PRACTITIONER

## 2020-08-08 PROCEDURE — 25000132 ZZH RX MED GY IP 250 OP 250 PS 637: Performed by: PHYSICIAN ASSISTANT

## 2020-08-08 PROCEDURE — 36415 COLL VENOUS BLD VENIPUNCTURE: CPT | Performed by: NURSE PRACTITIONER

## 2020-08-08 PROCEDURE — 25000128 H RX IP 250 OP 636: Performed by: PHYSICIAN ASSISTANT

## 2020-08-08 PROCEDURE — 96376 TX/PRO/DX INJ SAME DRUG ADON: CPT

## 2020-08-08 PROCEDURE — 80048 BASIC METABOLIC PNL TOTAL CA: CPT | Performed by: PHYSICIAN ASSISTANT

## 2020-08-08 PROCEDURE — 36415 COLL VENOUS BLD VENIPUNCTURE: CPT | Performed by: PHYSICIAN ASSISTANT

## 2020-08-08 PROCEDURE — G0378 HOSPITAL OBSERVATION PER HR: HCPCS

## 2020-08-08 PROCEDURE — 86140 C-REACTIVE PROTEIN: CPT | Performed by: PHYSICIAN ASSISTANT

## 2020-08-08 PROCEDURE — 85025 COMPLETE CBC W/AUTO DIFF WBC: CPT | Performed by: NURSE PRACTITIONER

## 2020-08-08 PROCEDURE — 25000131 ZZH RX MED GY IP 250 OP 636 PS 637: Performed by: NURSE PRACTITIONER

## 2020-08-08 RX ORDER — DIPHENHYDRAMINE HCL 25 MG
25 CAPSULE ORAL AT BEDTIME
Qty: 3 CAPSULE | Refills: 0 | Status: SHIPPED | OUTPATIENT
Start: 2020-08-08 | End: 2020-08-11

## 2020-08-08 RX ORDER — CETIRIZINE HYDROCHLORIDE 10 MG/1
10 TABLET ORAL 2 TIMES DAILY
Qty: 6 TABLET | Refills: 0 | Status: SHIPPED | OUTPATIENT
Start: 2020-08-08 | End: 2020-08-11

## 2020-08-08 RX ORDER — HYDROXYZINE HYDROCHLORIDE 25 MG/1
25 TABLET, FILM COATED ORAL EVERY 6 HOURS PRN
Status: DISCONTINUED | OUTPATIENT
Start: 2020-08-08 | End: 2020-08-08

## 2020-08-08 RX ORDER — HYDROXYZINE HYDROCHLORIDE 25 MG/1
25 TABLET, FILM COATED ORAL EVERY 6 HOURS PRN
COMMUNITY
Start: 2020-03-25

## 2020-08-08 RX ORDER — HYDROXYZINE HYDROCHLORIDE 25 MG/1
25 TABLET, FILM COATED ORAL EVERY 4 HOURS PRN
Status: DISCONTINUED | OUTPATIENT
Start: 2020-08-08 | End: 2020-08-08 | Stop reason: HOSPADM

## 2020-08-08 RX ORDER — SULFAMETHOXAZOLE/TRIMETHOPRIM 800-160 MG
1 TABLET ORAL 2 TIMES DAILY
Status: ON HOLD | COMMUNITY
Start: 2020-08-01 | End: 2020-08-08

## 2020-08-08 RX ORDER — TRIAMCINOLONE ACETONIDE 1 MG/G
OINTMENT TOPICAL 2 TIMES DAILY
Qty: 453.6 G | Refills: 0 | Status: SHIPPED | OUTPATIENT
Start: 2020-08-08 | End: 2020-08-13

## 2020-08-08 RX ORDER — PREDNISONE 10 MG/1
TABLET ORAL
Qty: 16 TABLET | Refills: 0 | Status: SHIPPED | OUTPATIENT
Start: 2020-08-08 | End: 2021-03-01

## 2020-08-08 RX ORDER — DIPHENHYDRAMINE HCL 25 MG
25 CAPSULE ORAL EVERY 6 HOURS
Status: DISCONTINUED | OUTPATIENT
Start: 2020-08-08 | End: 2020-08-08 | Stop reason: HOSPADM

## 2020-08-08 RX ORDER — ESCITALOPRAM OXALATE 5 MG/1
20 TABLET ORAL DAILY
Status: DISCONTINUED | OUTPATIENT
Start: 2020-08-08 | End: 2020-08-08 | Stop reason: HOSPADM

## 2020-08-08 RX ORDER — PREDNISONE 20 MG/1
40 TABLET ORAL ONCE
Status: COMPLETED | OUTPATIENT
Start: 2020-08-08 | End: 2020-08-08

## 2020-08-08 RX ORDER — PREDNISONE 20 MG/1
40 TABLET ORAL DAILY
Status: DISCONTINUED | OUTPATIENT
Start: 2020-08-08 | End: 2020-08-08

## 2020-08-08 RX ADMIN — ACETAMINOPHEN 1000 MG: 500 TABLET, FILM COATED ORAL at 08:15

## 2020-08-08 RX ADMIN — HYDROXYZINE HYDROCHLORIDE 25 MG: 25 TABLET, FILM COATED ORAL at 10:19

## 2020-08-08 RX ADMIN — CETIRIZINE HYDROCHLORIDE 10 MG: 10 TABLET, FILM COATED ORAL at 01:18

## 2020-08-08 RX ADMIN — DIPHENHYDRAMINE HYDROCHLORIDE 25 MG: 25 CAPSULE ORAL at 08:15

## 2020-08-08 RX ADMIN — ESCITALOPRAM 20 MG: 5 TABLET, FILM COATED ORAL at 08:15

## 2020-08-08 RX ADMIN — SODIUM CHLORIDE: 9 INJECTION, SOLUTION INTRAVENOUS at 02:02

## 2020-08-08 RX ADMIN — FAMOTIDINE 40 MG: 20 INJECTION, SOLUTION INTRAVENOUS at 02:01

## 2020-08-08 RX ADMIN — METHYLPREDNISOLONE SODIUM SUCCINATE 62.5 MG: 125 INJECTION, POWDER, FOR SOLUTION INTRAMUSCULAR; INTRAVENOUS at 04:27

## 2020-08-08 RX ADMIN — PREDNISONE 40 MG: 20 TABLET ORAL at 10:19

## 2020-08-08 RX ADMIN — FAMOTIDINE 20 MG: 20 INJECTION, SOLUTION INTRAVENOUS at 08:19

## 2020-08-08 RX ADMIN — HYDROXYZINE HYDROCHLORIDE 25 MG: 25 TABLET, FILM COATED ORAL at 02:01

## 2020-08-08 RX ADMIN — DIPHENHYDRAMINE HYDROCHLORIDE 25 MG: 25 CAPSULE ORAL at 02:01

## 2020-08-08 ASSESSMENT — MIFFLIN-ST. JEOR: SCORE: 1430.36

## 2020-08-08 NOTE — CONSULTS
[facility] : Inpatient    Impression and Recommendations (Patient Counseled on the Following):  #Morbilliform eruption with associated fever  Reassuringly without facial involvement, lymphadenopathy, eosinophilia or LFT/Cr abnormality. Given initiation of likely culprit medication in Bactrim 8/1/20; favor simple morbiliform drug eruption. Differential would additionally include viral illness given prodrome and cytopenias. Regardless, treatment would be supportive cares. The culprit drug should be discontinued and added to the patient's Allergy list. Status post high-dose methylprednisolone and appears clinically stable. We are comfortable with discharge at this time with strict return precautions for worsening rash, fevers, lymphadenopathy, oral/vaginal/occular involvement, etc. We will coordinate follow-up this coming week.    Plan:  - Continue to hold all non-essential medications - confirmed Bactrim added as allergy  - Trend CBC with differential and CMP daily while admitted  - START triamcinolone 0.1% ointment - can apply as wet wraps with instructions included below  - Can continue anti-histamines as currently prescribed  - Defer to primary team with regard to steroid taper; however, with simple morbiliform eruptions, topicals alone represent adequate treatment      Follow-up: Message sent to scheduling to facilitate follow-up this week  Staff: Dr. Hoyt       Thank you for the opportunity be involved in the care of this patient.     Staff(above)/Resident([place])      Saji Yates MD  Internal Medicine - Dermatology PGY 2  [phone]      Wet wraps instructions:   A) Triamcinolone 0.1% ointment to arms, legs, abdomen, back and chest  B) Warm damp kerlex around extremities over the triamcinolone ointment  C) Warm damp cloth or damp chux on chest/abdomen/back  D) Cover damp wraps with dry ones.   E) Apply at a minimum twice daily and leave on for at least one  hour, longer duration of time if patient can tolerate.       _____________________________________________________________________________    Encounter Date: Aug 7, 2020    CC:   Chief Complaint   Patient presents with     Hives     Fever       History of Present Illness:  I have reviewed the teledermatology consult information and the referring clinician s clinic note corresponding to this issue. Gabriela Norton is a 26 yo female who presents as a teledermatology consult for rash.  Patient has a history of major depressive disorder but is otherwise well.  Has been on long-term escitalopram without any additional recent medication changes.  Was seen 8/1/2020 for a cyst on her medial left upper thigh.  Has had worsening redness and was concerned for infection.  Incision and drainage was performed and the patient was placed on a course of Bactrim.  8/6/20 Patient had fevers to 102, myalgias and headache. Was seen at [facility] with negative COVID testing, reassuring labs and UA. Subsequently developed a full body rash PM 8/7/20 resulting in her seeking cares at the [facility]. Here, patient notably febrile with mild thrombocytopenia and leukopenia. Given 140 mg methylpred, zyrtec, benadryl, atarax and famotidine. No further fevers, facial involvement or lymphadenopathy. Derm consulted for assistance with management.    Physical Examination:  Skin: Focused examination of the abdomen, chest, bilateral upper extremities within the teledermatology photograph(s) was performed.   -Finely raised erythematous coalescing papules and plaques on the arms and trunk                    Past Medical History:   Patient Active Problem List   Diagnosis     Nausea vomiting and diarrhea     Allergic reaction     Past Medical History:   Diagnosis Date     Asthma      Past Surgical History:   Procedure Laterality Date     BIOPSY OF SKIN LESION       HC TOOTH EXTRACTION W/FORCEP         Social History:  Non-smoker; occasional  alcohol use.    Family History:  Family History   Problem Relation Age of Onset     Skin Cancer Maternal Grandmother      Skin Cancer Paternal Grandmother      Heart Defect Father      Cerebrovascular Disease Father      Melanoma No family hx of        Medications:  Current Facility-Administered Medications   Medication     acetaminophen (TYLENOL) Suppository 650 mg     acetaminophen (TYLENOL) tablet 1,000 mg     albuterol (PROVENTIL) neb solution 2.5 mg     cetirizine (zyrTEC) tablet 10 mg     diphenhydrAMINE (BENADRYL) capsule 25 mg     EPINEPHrine (ANY BX GENERIC EQUIV) injection 0.3 mg     escitalopram (LEXAPRO) tablet 20 mg     famotidine (PEPCID) infusion 20 mg     hydrOXYzine (ATARAX) tablet 25 mg     lidocaine (LMX4) cream     lidocaine 1 % 0.1-1 mL     methylPREDNISolone sodium succinate (solu-MEDROL) injection 62.5 mg     ondansetron (ZOFRAN-ODT) ODT tab 4 mg    Or     ondansetron (ZOFRAN) injection 4 mg     sodium chloride (PF) 0.9% PF flush 3 mL     sodium chloride (PF) 0.9% PF flush 3 mL     sodium chloride 0.9% infusion          Allergies   Allergen Reactions     Bactrim [Sulfamethoxazole W/Trimethoprim] Rash     Amoxicillin Rash       _____________________________________________________________________________    Teledermatology information:  - Location of patient: ([address], [place] [address], [place] [address])  - Patient presented in referral from: ED Obs [place]  - Location of teledermatologist: Home  - Reason teledermatology is appropriate: of National Emergency Regarding Coronavirus disease (COVID 19) Outbreak  - The patient's condition can safely be assessed using telemedicine: yes  - Method of transmission: store and forward teledermatology  - Image quality and interpretability: acceptable  - Physician has received verbal consent for a Video/Photos Visit from the patient? Yes  - In-person dermatology visit recommendation: no  - Date of images: 8/7/20  - Service start time:9:17  -  Service end time:9:39  - Date of report: 08/08/20

## 2020-08-08 NOTE — ED PROVIDER NOTES
ED Provider Note  [facility]      History     Chief Complaint   Patient presents with     Hives     Fever     HPI  Gabriela Norton is a 25 year old female who presents with diffuse body rash and fever. She had a sebaceous cyst incised and drained at the [facility] urgency room on 8/1/20. She was started on oral Bactrim. Yesterday she developed fever and generalized myalgias. She was seen in this ED. Labs were obtained. WBC was low at 2.8 with primarily lymphopenia. UA was normal. Electrolytes, BUN and creatinine were normal. Covid-19 PCR was negative. She was diagnosed with suspected viral syndrome. Today she developed a diffuse itching body rash. She had fever to 102F at home today. She continues to have fatigue and myalgias. She has no eye redness, chills, mouth sores, cough, shortness of breath, nausea, vomiting, abdominal pain, dysuria or hematuria. She does not recall being treated with Bactrim in the past. She has history of allergy to amoxacillin.    Past Medical History  Past Medical History:   Diagnosis Date     Asthma      Past Surgical History:   Procedure Laterality Date     BIOPSY OF SKIN LESION       HC TOOTH EXTRACTION W/FORCEP       albuterol (PROAIR HFA/PROVENTIL HFA/VENTOLIN HFA) 108 (90 BASE) MCG/ACT Inhaler  escitalopram (LEXAPRO) 20 MG tablet  Levonorgestrel (MIRENA, 52 MG, IU)  ondansetron (ZOFRAN-ODT) 4 MG ODT tab      Allergies   Allergen Reactions     Amoxicillin Rash     Past medical history, past surgical history, medications, and allergies were reviewed with the patient. Additional pertinent items: None    Family History  Family History   Problem Relation Age of Onset     Skin Cancer Maternal Grandmother      Skin Cancer Paternal Grandmother      Heart Defect Father      Cerebrovascular Disease Father      Melanoma No family hx of      Family history was reviewed with the patient. Additional pertinent items: None    Social History  Social History     Tobacco Use      "Smoking status: Never Smoker     Smokeless tobacco: Never Used   Substance Use Topics     Alcohol use: Yes     Comment: occasional     Drug use: Yes     Frequency: 1.0 times per week     Types: Marijuana      Social history was reviewed with the patient. Additional pertinent items: None    Review of Systems   Constitutional: Positive for fatigue and fever. Negative for chills.   HENT: Negative for congestion, mouth sores and tinnitus.    Eyes: Negative for redness and visual disturbance.   Respiratory: Negative for cough, shortness of breath and wheezing.    Cardiovascular: Negative for chest pain.   Gastrointestinal: Negative for abdominal pain and nausea.   Genitourinary: Negative for difficulty urinating.   Musculoskeletal: Negative for back pain and neck pain.   Skin: Positive for rash.   Neurological: Negative for weakness, light-headedness, numbness and headaches.   Hematological: Negative for adenopathy.   Psychiatric/Behavioral: Negative for confusion.         Physical Exam   BP: 115/74  Pulse: 88  Temp: 100  F (37.8  C)  Resp: 18  Height: 162.6 cm (5' 4\")  Weight: 68 kg (150 lb)  SpO2: 97 %  Physical Exam  Vitals signs and nursing note reviewed.   Constitutional:       Appearance: She is not ill-appearing.   HENT:      Head: Normocephalic and atraumatic.      Right Ear: External ear normal.      Left Ear: External ear normal.      Nose: Nose normal.      Mouth/Throat:      Mouth: Mucous membranes are moist.   Eyes:      General: No scleral icterus.     Extraocular Movements: Extraocular movements intact.      Pupils: Pupils are equal, round, and reactive to light.   Neck:      Musculoskeletal: Normal range of motion and neck supple.   Cardiovascular:      Rate and Rhythm: Normal rate and regular rhythm.      Heart sounds: No murmur.   Pulmonary:      Effort: Pulmonary effort is normal.      Breath sounds: Normal breath sounds. No wheezing or rales.   Abdominal:      General: Abdomen is flat.      " Palpations: Abdomen is soft.      Tenderness: There is no abdominal tenderness.   Musculoskeletal:      Right lower leg: No edema.      Left lower leg: No edema.        Legs:    Lymphadenopathy:      Cervical: No cervical adenopathy.   Skin:     Findings: Erythema and rash present. Rash is macular and purpuric. Rash is not pustular.   Neurological:      General: No focal deficit present.      Mental Status: She is alert and oriented to person, place, and time.   Psychiatric:         Mood and Affect: Mood normal.         Behavior: Behavior normal.                     ED Course      Procedures             Results for orders placed or performed during the hospital encounter of 08/07/20   CBC with platelets differential     Status: Abnormal   Result Value Ref Range    WBC 2.0 (L) 4.0 - 11.0 10e9/L    RBC Count 4.57 3.8 - 5.2 10e12/L    Hemoglobin 14.3 11.7 - 15.7 g/dL    Hematocrit 44.1 35.0 - 47.0 %    MCV 97 78 - 100 fl    MCH 31.3 26.5 - 33.0 pg    MCHC 32.4 31.5 - 36.5 g/dL    RDW 11.5 10.0 - 15.0 %    Platelet Count 122 (L) 150 - 450 10e9/L    Diff Method Manual Differential     % Neutrophils 43.2 %    % Lymphocytes 33.9 %    % Monocytes 5.1 %    % Eosinophils 12.7 %    % Basophils 0.0 %    % Myelocytes 5.1 %    Absolute Neutrophil 0.9 (L) 1.6 - 8.3 10e9/L    Absolute Lymphocytes 0.7 (L) 0.8 - 5.3 10e9/L    Absolute Monocytes 0.1 0.0 - 1.3 10e9/L    Absolute Eosinophils 0.3 0.0 - 0.7 10e9/L    Absolute Basophils 0.0 0.0 - 0.2 10e9/L    Absolute Myelocytes 0.1 (H) 0 10e9/L    RBC Morphology Normal     Platelet Estimate Confirming automated cell count    CRP inflammation     Status: Abnormal   Result Value Ref Range    CRP Inflammation 30.0 (H) 0.0 - 8.0 mg/L   Comprehensive metabolic panel     Status: Abnormal   Result Value Ref Range    Sodium 135 133 - 144 mmol/L    Potassium 3.7 3.4 - 5.3 mmol/L    Chloride 106 94 - 109 mmol/L    Carbon Dioxide 24 20 - 32 mmol/L    Anion Gap 5 3 - 14 mmol/L    Glucose 87 70 - 99  mg/dL    Urea Nitrogen 6 (L) 7 - 30 mg/dL    Creatinine 0.89 0.52 - 1.04 mg/dL    GFR Estimate >90 >60 mL/min/[1.73_m2]    GFR Estimate If Black >90 >60 mL/min/[1.73_m2]    Calcium 8.5 8.5 - 10.1 mg/dL    Bilirubin Total 0.2 0.2 - 1.3 mg/dL    Albumin 3.6 3.4 - 5.0 g/dL    Protein Total 7.6 6.8 - 8.8 g/dL    Alkaline Phosphatase 55 40 - 150 U/L    ALT 22 0 - 50 U/L    AST 26 0 - 45 U/L   UA with Microscopic     Status: Abnormal   Result Value Ref Range    Color Urine Light Yellow     Appearance Urine Clear     Glucose Urine Negative NEG^Negative mg/dL    Bilirubin Urine Negative NEG^Negative    Ketones Urine Negative NEG^Negative mg/dL    Specific Gravity Urine 1.002 (L) 1.003 - 1.035    Blood Urine Moderate (A) NEG^Negative    pH Urine 6.0 5.0 - 7.0 pH    Protein Albumin Urine Negative NEG^Negative mg/dL    Urobilinogen mg/dL Normal 0.0 - 2.0 mg/dL    Nitrite Urine Negative NEG^Negative    Leukocyte Esterase Urine Negative NEG^Negative    Source Midstream Urine     WBC Urine 0 0 - 5 /HPF    RBC Urine <1 0 - 2 /HPF    Bacteria Urine Few (A) NEG^Negative /HPF    Squamous Epithelial /HPF Urine 5 (H) 0 - 1 /HPF    sperm Present (A) NEG^Negative /HPF   Mononucleosis screen     Status: None   Result Value Ref Range    Mononucleosis Screen Negative NEG^Negative     Medications   diphenhydrAMINE (BENADRYL) injection 25 mg (25 mg Intravenous Given 8/7/20 2038)   methylPREDNISolone sodium succinate (solu-MEDROL) injection 81.25 mg (81.25 mg Intravenous Given 8/7/20 2222)        Assessments & Plan (with Medical Decision Making)   Impression:  Young woman presents with diffuse morbilliform rash associated with fever, leukopenia and thrombocytopenia after 7 days of treatment with Bactrim following I and D of a sebaceous cyst. She has fevers, myalgias, and fatigue. She does not have conjunctivitis or mouth sores. She had negative Covid-19 PCR yesterday. She has mild leukopenia yesterday which is more pronounced today. She has  small blood in the urine without protein or RBC on microscopic exam. CRP is moderately elevated. Eosinophils are not elevated. I believe her morbilloform rash with fever and leukopenia this represents allergic drug rash to Bactrim with systemic involvement. She was treated with IV solumedrol in the ED. I will admit her to the ED observation service. Bactrim should be stopped. We should recheck her CBC in the morning. Dermatology consultation may be needed if the rash progresses.    I have reviewed the nursing notes. I have reviewed the findings, diagnosis, plan and need for follow up with the patient.    New Prescriptions    No medications on file       Final diagnoses:   Allergic drug rash   Fever and chills   Leukopenia, unspecified type       --  AMOL HERNANDEZ MD   Emergency Medicine   [facility] [place], EMERGENCY DEPARTMENT  8/7/2020     Amol Hernandez MD  08/07/20 4015

## 2020-08-08 NOTE — ED TRIAGE NOTES
Patient came in for new onset of hives.  They had a cyst removed on Saturday and then came in yesterday for back pain and fevers.  Then when they woke up today they had a rash all over their body.

## 2020-08-08 NOTE — PLAN OF CARE
OBS Goals  - No return of symptoms after 12 hours of observation- pt has only been on observation for 2 hrs  - No oropharyngeal edema or bronchospasm- met  - Vital signs normal or at patient baseline-   - Tolerating oral intake to maintain hydration- slightly febrile  - Dyspnea improved if present and oxygen saturations greater than 88% on room air or prior home oxygen levels-0 met  - Safe disposition plan has been identified- not met

## 2020-08-08 NOTE — PROGRESS NOTES
OBS GOALS    - No return of symptoms after 12 hours of observation: Pt has itching and diffuse rash. Benadryl and atarax have been helpful     - No oropharyngeal edema or bronchospasm: Pt has had no signs of respiratory distress    - Vital signs normal or at patient baseline:  Temp 100.0, OVSS.     - Tolerating oral intake to maintain hydration: Still taking in adequate amount of fluids orally    - Dyspnea improved if present and oxygen saturations greater than 88% on room air or prior home oxygen levels: Pt has no dyspnea    - Safe disposition plan has been identified: Pt is going home with  and medication to keep reaction at bay

## 2020-08-08 NOTE — PLAN OF CARE
AVSS.  Denies p/n/v.  C/o generalized itchiness with rash.  Given benadryl, zyrtec, solu-medrol, and atarax fir itching/reaction.  Will continue to monitor.    OBS goals:   OBS Goals  - No return of symptoms after 12 hours of observation- pt has only been on observation for ~4 hrs  - No oropharyngeal edema or bronchospasm- met  - Vital signs normal or at patient baseline-   - Tolerating oral intake to maintain hydration- slightly febrile  - Dyspnea improved if present and oxygen saturations greater than 88% on room air or prior home oxygen levels- met  - Safe disposition plan has been identified- not met

## 2020-08-08 NOTE — H&P
[facility], [place]    History and Physical - ED Observation Service       Date of Admission:  8/7/2020    Assessment & Plan   Gabriela Norton is a 25 year old female admitted on 8/7/2020. She has a history of MDD, CRISTINE who presented to the ED today with diffuse morbilliform rash, mayalgias, fever associated leukopenia and thrombocytopenia after 7 days of Bactrim s/p sebaceous cyst I&D on 8/1/20.    # Fever, Mayalgias, Rash  # Leukopenia  # Thrombocytopenia  sebaceous cyst I&D'ed [facility] 8/1/20, started on Bactrim BID x 10 days. 3 days ago, developed fevers, generalized myalgias, back pain. P/t ED w/ WBC 2.8, lymphopenia, otherwise negative UA, CXR, Covid-19. Diagnosed with suspected viral syndrome. Today awoke with mild diffuse itching body rash that has progressively worsened, fever 102F, ongoing fatigue, myalgias, intense pruritis. No lip swelling, SOB, chest pain, difficulty breathing or swallowing, eye redness, mouth sores, cough, nausea, vomiting, abdominal pain, diarrhea, dysuria, hematuria, camping or hiking, food allergies, unusual ingestion, new medications. In ED, HR 88, /74, RR 18, SaO2 97% on RA, Temp 100. Labs show normal CMP. CBC with WBC 2, platelet 122, abs neutrophil 0.9, abs lymphocytes 0.7 otherwise normal. UA reports moderate blood, few bacteria, 5 SE. CRP 30. Mono screen negative. CXR negative. In ED the patient was given benadryl 25mg IV x 1, methylprednisolone 81mg IV x 1. DDx: drug reaction to bactrim, viral illness  - Continue methylprednisolone 60 mg IV every 6 hours. Change to Prednisone tomorrow if patient is improving  - Continue Benadryl 25 mg p.o. every 6 hours  - Start Pepcid 20 mg IV twice daily  - Zyrtec 10 mg p.o. daily x1 now  - Atarax 25 mg p.o. every 4 hours as needed pruritus.  May increase to 50 mg if needed.  - Epi at bedside  - IVF with NS at 100 mL/h  - Consider dermatology consult in the morning  - Hold Bactrim  - Repeat CBC, UA, CRP in  a.m.    # MDD, CRISTINE: - Continue with PTA Lexapro, Atarax      Diet: Clears  DVT Prophylaxis: Low Risk/Ambulatory with no VTE prophylaxis indicated  Palm Catheter: not present  Code Status: full code         Disposition Plan   Expected discharge: Tomorrow, recommended to prior living arrangement once adequate pain management/ tolerating PO medications and improving rash, consult if needed completed with dispo plan  Entered: CRYSTAL Newbrery 08/07/2020, 11:20 PM     The patient's care was discussed with the Attending Physician, Dr. Denise.    CRYSTAL Newberry  [facility]  Ascom: [id number]  Please see sticky note for cross cover information  ______________________________________________________________________    Chief Complaint   Full body rash    History is obtained from the patient    History of Present Illness   Gabriela Norton is a 25 year old female with a history of MDD, CRISTINE who presented to the ED with diffuse body rash and fever. She had a sebaceous cyst that was I&D[facility]ed [facility] on 8/1/20 and was started on Bactrim BID x 10 days. 3 days ago, she developed fevers, generalized myalgias and back pain. She was seen in our ED with labs showing WBC of 2.8 with primarily lymphopenia. UA was normal. Electrolytes, BUN and creatinine were normal. Blood cultures were sent. CXR negative. Covid-19 PCR negative. She was diagnosed with suspected viral syndrome with supportive cares.    Today she awoke with a mild diffuse itching body rash that has progressively worsened over the course of the day, fever to 102F at home. She has continued to have fatigue and myalgias. Reports intense pruritis. Denies any lip swelling, SOB, chest pain, difficulty breathing or swallowing, eye redness, mouth sores, cough, nausea, vomiting, abdominal pain, diarrhea, dysuria, hematuria, camping or hiking. She does not recall being treated with Bactrim in the  past. Her last dose was tonight at 6pm. She has history of allergy to amoxicillin as a child and does not recall specifically what reaction she had to it. She denies eating anything out of the ordinary over the last week and is not aware of any food allergies. Denies taking any other new medications.     In the ED, HR 88, /74, RR 18, SaO2 97% on RA, Temp 100. Labs show normal CMP. CBC with WBC 2, platelet 122, abs neutrophil 0.9, abs lymphocytes 0.7 otherwise normal. UA reports moderate blood, few bacteria, 5 SE. CRP 30. Mono screen negative. CXR negative. In the ED the patient was given benadryl 25mg IV x 1, methylprednisolone 81mg IV x 1. She is being admitted for continued management.     Review of Systems    All other ROS negative except those mentioned in above note.      Past Medical History    I have reviewed this patient's medical history and updated it with pertinent information if needed.   Past Medical History:   Diagnosis Date     Asthma        Past Surgical History   I have reviewed this patient's surgical history and updated it with pertinent information if needed.  Past Surgical History:   Procedure Laterality Date     BIOPSY OF SKIN LESION       HC TOOTH EXTRACTION W/FORCEP         Social History   I have reviewed this patient's social history and updated it with pertinent information if needed.  Social History     Tobacco Use     Smoking status: Never Smoker     Smokeless tobacco: Never Used   Substance Use Topics     Alcohol use: Yes     Comment: occasional     Drug use: Yes     Frequency: 1.0 times per week     Types: Marijuana       Family History   I have reviewed this patient's family history and updated it with pertinent information if needed.  Family History   Problem Relation Age of Onset     Skin Cancer Maternal Grandmother      Skin Cancer Paternal Grandmother      Heart Defect Father      Cerebrovascular Disease Father      Melanoma No family hx of        Prior to Admission  Medications   Prior to Admission Medications   Prescriptions Last Dose Informant Patient Reported? Taking?   Levonorgestrel (MIRENA, 52 MG, IU)   Yes No   albuterol (PROAIR HFA/PROVENTIL HFA/VENTOLIN HFA) 108 (90 BASE) MCG/ACT Inhaler   Yes No   Sig: Inhale 2 puffs into the lungs   escitalopram (LEXAPRO) 20 MG tablet   Yes No   Sig: Take 20 mg by mouth daily   ondansetron (ZOFRAN-ODT) 4 MG ODT tab   No No   Sig: Take 1 tablet (4 mg) by mouth every 6 hours as needed for nausea or vomiting   Patient not taking: Reported on 2/6/2019      Facility-Administered Medications: None     Allergies   Allergies   Allergen Reactions     Amoxicillin Rash       Physical Exam   Vital Signs: Temp: 100  F (37.8  C) Temp src: Oral BP: 115/74 Pulse: 88   Resp: 18 SpO2: 97 % O2 Device: None (Room air)    Weight: 150 lbs 0 oz    Constitutional: awake, alert, cooperative, no apparent distress, and appears stated age  Eyes: Lids and lashes normal, pupils equal, round and reactive to light, extra ocular muscles intact, sclera clear, conjunctiva normal  ENT: Normocephalic, without obvious abnormality, atraumatic, sinuses nontender on palpation, external ears without lesions, oral pharynx with moist mucous membranes, tonsils without erythema or exudates, gums normal and good dentition.  Hematologic / Lymphatic: no cervical lymphadenopathy  Respiratory: No increased work of breathing, good air exchange, clear to auscultation bilaterally, no crackles or wheezing  Cardiovascular: Normal apical impulse, regular rate and rhythm, normal S1 and S2, no S3 or S4, and no murmur noted  GI: No scars, normal bowel sounds, soft, non-distended, non-tender, no masses palpated, no hepatosplenomegally  Skin: diffuse macular papular rash over entire body, less on legs. Diffuse erythema on face and neck.   Musculoskeletal: There is no redness, warmth, or swelling of the joints.  Full range of motion noted.  Motor strength is 5 out of 5 all extremities  bilaterally.  Tone is normal.  Neurologic: Awake, alert, oriented to name, place and time.  Cranial nerves II-XII are grossly intact.  Motor is 5 out of 5 bilaterally.  Cerebellar finger to nose, heel to shin intact.  Sensory is intact.  Babinski down going, Romberg negative, and gait is normal.  Neuropsychiatric: General: normal, calm and normal eye contact    Data   Data reviewed today: I reviewed all medications, new labs and imaging results over the last 24 hours.    Recent Labs   Lab 08/07/20 2039 08/06/20  1349   WBC 2.0* 2.8*   HGB 14.3 15.2   MCV 97 97   * 175    135   POTASSIUM 3.7 3.6   CHLORIDE 106 104   CO2 24 24   BUN 6* 8   CR 0.89 1.01   ANIONGAP 5 7   CHAD 8.5 9.0   GLC 87 75   ALBUMIN 3.6  --    PROTTOTAL 7.6  --    BILITOTAL 0.2  --    ALKPHOS 55  --    ALT 22  --    AST 26  --      Most Recent 3 CBC's:  Recent Labs   Lab Test 08/07/20  2039 08/06/20  1349 02/10/18  0617   WBC 2.0* 2.8* 3.5*   HGB 14.3 15.2 12.7   MCV 97 97 93   * 175 145*     Most Recent 3 BMP's:  Recent Labs   Lab Test 08/07/20 2039 08/06/20 1349 02/10/18  0617    135 140   POTASSIUM 3.7 3.6 3.5   CHLORIDE 106 104 109   CO2 24 24 20   BUN 6* 8 12   CR 0.89 1.01 0.72   ANIONGAP 5 7 11   CHAD 8.5 9.0 8.3*   GLC 87 75 54*     Most Recent 2 LFT's:  Recent Labs   Lab Test 08/07/20 2039 02/09/18  2045   AST 26 17   ALT 22 12   ALKPHOS 55 66   BILITOTAL 0.2 0.8     Most Recent 3 Hemoglobins:  Recent Labs   Lab Test 08/07/20 2039 08/06/20  1349 02/10/18  0617   HGB 14.3 15.2 12.7     Most Recent 6 Bacteria Isolates From Any Culture (See EPIC Reports for Culture Details):  Recent Labs   Lab Test 08/06/20  1412 08/06/20  1346   CULT No growth after 2 days No growth after 2 days     Most Recent Urinalysis:  Recent Labs   Lab Test 08/07/20  2044   COLOR Light Yellow   APPEARANCE Clear   URINEGLC Negative   URINEBILI Negative   URINEKETONE Negative   SG 1.002*   UBLD Moderate*   URINEPH 6.0   PROTEIN Negative    NITRITE Negative   LEUKEST Negative   RBCU <1   WBCU 0     Most Recent ESR & CRP:  Recent Labs   Lab Test 08/07/20 2039   CRP 30.0*     No results found for this or any previous visit (from the past 24 hour(s)).

## 2020-08-08 NOTE — DISCHARGE SUMMARY
General acute hospital, [place]  Hospitalist Discharge Summary      Date of Admission:  8/7/2020  Date of Discharge:  8/8/2020  Discharging Provider: SUZETTE Walsh CNP  Discharge Team: Emergency Department Observation Unit    Discharge Diagnoses   Morbilliform eruption with associated fever    Follow-ups Needed After Discharge   Follow-up Appointments     Adult [facility] Follow-up and recommended labs and tests      Follow up with your primary care doctor in one week for hospital follow   up.    Follow up with outpatient Dermatology. They will call you to set up an   appointment.     Appointments on [place] and/or [facility] (with [facility] or [facility]   provider or service). Call [phone] if you haven't heard regarding   these appointments within 7 days of discharge.         {Additional follow-up instructions/to-do's for PCP    : Hospital follow up     Unresulted Labs Ordered in the Past 30 Days of this Admission     Date and Time Order Name Status Description    8/6/2020 1249 Blood culture Preliminary     8/6/2020 1249 Blood culture Preliminary       These results will be followed up by PCP    Discharge Disposition   Discharged to home  Condition at discharge: Stable      Hospital Course   Gabriela Norton is a 25 year old female admitted on 8/7/2020. She has a history of MDD, CRISTINE who presented to the ED today with diffuse morbilliform rash, mayalgias, fever associated leukopenia and thrombocytopenia after 7 days of Bactrim s/p sebaceous cyst I&D on 8/1/20.     #Morbilliform eruption with associated fever  sebaceous cyst I&D'ed [facility] 8/1/20, started on Bactrim BID x 10 days. 3 days ago, developed fevers, generalized myalgias, back pain. She took her Bactrim last evening. 8/7/20, awoke with mild diffuse itching body rash that has progressively worsened, fever 102F, ongoing fatigue, myalgias, intense pruritis. No lip swelling, SOB, chest pain, difficulty breathing or swallowing, eye redness,  mouth sores, cough, nausea, vomiting, abdominal pain, diarrhea, dysuria, hematuria, camping or hiking, food allergies, unusual ingestion.Mono screen negative. CXR negative. In ED the patient was given benadryl 25mg IV x 1, methylprednisolone 81mg IV x 1. DDx: drug reaction to bactrim, viral illness. Dermatology consulted and reviewed patient's chart.  Given initiation of likely culprit medication in Bactrim 8/1/20; favor simple morbiliform drug eruption. Recommended treatment with supportive cares. Bactrim added to allergy list. Discussed return precautions for worsening rash, fevers, lymphadenopathy, oral/vaginal/occular involvement. Dermatology will coordinate follow-up this coming week. Dermatology recommended START triamcinolone 0.1% ointment - can apply as wet wraps.  continue anti-histamines- Defer to Patient was continued on a steroid taper.     # MDD, CRISTINE: - Continue with PTA Lexapro, Atarax      Consultations This Hospital Stay   DERMATOLOGY IP CONSULT    Code Status   Full Code    Time Spent on this Encounter   I, SUZETTE Walsh CNP, personally saw the patient today and spent less than or equal to 30 minutes discharging this patient.       SUZETTE Walsh CNP  [facility] [place]  ______________________________________________________________________    Physical Exam   Vital Signs: Temp: 100  F (37.8  C) Temp src: Oral BP: 104/59 Pulse: 74   Resp: 18 SpO2: 96 % O2 Device: None (Room air)    Weight: 153 lbs 4.8 oz  Constitutional: awake, alert, cooperative, no apparent distress, and appears stated age  Eyes: Lids and lashes normal, pupils equal, round and reactive to light, extra ocular muscles intact, sclera clear, conjunctiva normal  ENT: Normocephalic, without obvious abnormality, atraumatic, sinuses nontender on palpation, external ears without lesions, oral pharynx with moist mucous membranes, tonsils without erythema or exudates, gums normal and good  dentition.  Hematologic / Lymphatic: no cervical lymphadenopathy  Respiratory: No increased work of breathing, good air exchange, clear to auscultation bilaterally, no crackles or wheezing  Cardiovascular: Normal apical impulse, regular rate and rhythm, normal S1 and S2, no S3 or S4, and no murmur noted  GI: No scars, normal bowel sounds, soft, non-distended, non-tender, no masses palpated, no hepatosplenomegally  Skin: diffuse macular papular rash over entire body, less on legs. Diffuse erythema on face and neck.   Musculoskeletal: There is no redness, warmth, or swelling of the joints.  Full range of motion noted.  Motor strength is 5 out of 5 all extremities bilaterally.  Tone is normal.  Neurologic: Awake, alert, oriented to name, place and time.  Cranial nerves II-XII are grossly intact.  Motor is 5 out of 5 bilaterally.  Cerebellar finger to nose, heel to shin intact.  Sensory is intact.  Babinski down going, Romberg negative, and gait is normal.  Neuropsychiatric: General: normal, calm and normal eye contact       Primary Care Physician   [facility]    Discharge Orders      Reason for your hospital stay    Drug reaction related to Bactrim.     Discharge Instructions    You were admitted to the ED observation for fever, generalized weakness and a rash all over your body. You were seen by dermatology who felt your symptoms were related to Bactrim. Please discontinue Bactrim.     For treatment of your rash START triamcinolone 0.1% ointment. Apply twice a day.   You can also do wet wraps with the ointment.  Wet wraps instructions:   A) Triamcinolone 0.1% ointment to arms, legs, abdomen, back and chest  B) Warm damp kerlex around extremities over the triamcinolone ointment  C) Warm damp cloth or damp chux on chest/abdomen/back  D) Cover damp wraps with dry ones.   E) Apply at a minimum twice daily and leave on for at least one hour, longer duration of time if patient can tolerate.     Continue  antihistimines: Zyrtec twice a day for 3 days and benadryl at bedtime for 3 days.     Continue Steroid taper: Take 4 tabs by mouth daily x 1 day (8/9), then 3 tabs daily x 2 days (8/10, 8/11), then 2 tab daily x 2 days  (8/12, 8/13), then 1 tab daily x 2 days  (8/14, 8/15), 1/2 tab daily x 1 day  (8/16).    Return to the ED with worsening rash, fevers,swollen lymph nodes, oral/vaginal/eye involvement.    Dermatology will coordinate follow-up this coming week.             Adult [facility] Follow-up and recommended labs and tests    Follow up with your primary care doctor in one week for hospital follow up.    Follow up with outpatient Dermatology. They will call you to set up an appointment.     Appointments on [place] and/or [facility] (with [facility] or [facility] provider or service). Call [phone] if you haven't heard regarding these appointments within 7 days of discharge.     Activity    Your activity upon discharge: activity as tolerated     When to contact your care team    Return to the ED:   -Your fever worsens  -You notice facial swelling or trouble breathing/swallowing  -Your rash worsens  - You notice painful ulcers in your mouth or on your genitals.     Diet    Follow this diet upon discharge: regular diet       Significant Results and Procedures   Results for orders placed or performed during the hospital encounter of 08/06/20   XR Chest Port 1 View    Narrative    Portable chest    INDICATION: Fever    COMPARISON: None    FINDINGS: Heart size and shape appear normal. The lungs, pulmonary  vascularity and bony structures appear intact.      Impression    IMPRESSION: Negative    LESLYE MORRELL MD       Discharge Medications   Current Discharge Medication List      START taking these medications    Details   cetirizine (ZYRTEC) 10 MG tablet Take 1 tablet (10 mg) by mouth 2 times daily for 3 days  Qty: 6 tablet, Refills: 0    Associated Diagnoses: Allergic reaction, initial encounter       diphenhydrAMINE (BENADRYL) 25 MG capsule Take 1 capsule (25 mg) by mouth At Bedtime for 3 days  Qty: 3 capsule, Refills: 0    Associated Diagnoses: Allergic reaction, initial encounter      predniSONE (DELTASONE) 10 MG tablet Take 4 tabs by mouth daily x 1 day, then 3 tabs daily x 2 days, then 2 tab daily x 2 days, then 1 tab daily x 2 days, 1/2 tab daily x 1 day.  Qty: 16 tablet, Refills: 0    Associated Diagnoses: Allergic reaction, initial encounter      triamcinolone (KENALOG) 0.1 % external ointment Apply topically 2 times daily for 5 days  Qty: 453.6 g, Refills: 0    Associated Diagnoses: Allergic reaction, initial encounter         CONTINUE these medications which have NOT CHANGED    Details   escitalopram (LEXAPRO) 20 MG tablet Take 20 mg by mouth daily      hydrOXYzine (ATARAX) 25 MG tablet Take 25 mg by mouth every 6 hours as needed      albuterol (PROAIR HFA/PROVENTIL HFA/VENTOLIN HFA) 108 (90 BASE) MCG/ACT Inhaler Inhale 2 puffs into the lungs      Levonorgestrel (MIRENA, 52 MG, IU)       ondansetron (ZOFRAN-ODT) 4 MG ODT tab Take 1 tablet (4 mg) by mouth every 6 hours as needed for nausea or vomiting  Qty: 10 tablet, Refills: 0    Associated Diagnoses: Non-intractable vomiting with nausea, unspecified vomiting type         STOP taking these medications       sulfamethoxazole-trimethoprim (BACTRIM DS) 800-160 MG tablet Comments:   Reason for Stopping:             Allergies   Allergies   Allergen Reactions     Bactrim [Sulfamethoxazole W/Trimethoprim] Rash     Amoxicillin Rash      Post-Care Instructions: I reviewed with the patient in detail post-care instructions. Patient is to wear sunprotection, and avoid picking at any of the treated lesions. Pt may apply Vaseline to crusted or scabbing areas. Include Z78.9 (Other Specified Conditions Influencing Health Status) As An Associated Diagnosis?: No Treatment Number (Will Not Render If 0): 0 Medical Necessity Information: It is in your best interest to select a reason for this procedure from the list below. All of these items fulfill various CMS LCD requirements except the new and changing color options. Anesthesia Volume In Cc: 0.5 Consent: The patient's consent was obtained including but not limited to risks of crusting, scabbing, blistering, scarring, darker or lighter pigmentary change, recurrence, incomplete removal and infection. Medical Necessity Clause: This procedure was medically necessary because the lesions that were treated were: Detail Level: Detailed

## 2020-08-08 NOTE — PROGRESS NOTES
Nursing Focus: Discharge    D: Patient discharged to home at 1345.  All belongings sent with patient an .    I: Discharge prescriptions sent to discharge pharmacy to be filled. All discharge medications and instructions reviewed with patient and . Patient instructed to call clinic triage nurse if she experiences a fever >100.4, lymph node or marvin/eye involvement. Other phone numbers to call with questions or concerns after discharge reviewed with patient and . Follow-up outpatient appointment scheduled reviewed with patient and .  piv removed. Education completed.  Care Plan goals met and adequate for discharge.    A: Patient and  verbalized understanding of discharge medications and instructions. Patient will  medications at discharge pharmacy.     P: Patient to follow-up as needed

## 2020-08-10 ENCOUNTER — TELEPHONE (OUTPATIENT)
Dept: DERMATOLOGY | Facility: CLINIC | Age: 25
End: 2020-08-10

## 2020-08-10 NOTE — TELEPHONE ENCOUNTER
Called patient to schedule hospital follow up appointment, no answer. Left voicemail and call back number.

## 2020-08-12 LAB
BACTERIA SPEC CULT: NO GROWTH
BACTERIA SPEC CULT: NO GROWTH
SPECIMEN SOURCE: NORMAL
SPECIMEN SOURCE: NORMAL

## 2020-08-17 ENCOUNTER — OFFICE VISIT (OUTPATIENT)
Dept: DERMATOLOGY | Facility: CLINIC | Age: 25
End: 2020-08-17
Payer: COMMERCIAL

## 2020-08-17 DIAGNOSIS — T50.905S ADVERSE EFFECT OF DRUG, SEQUELA: ICD-10-CM

## 2020-08-17 DIAGNOSIS — T50.905S ADVERSE EFFECT OF DRUG, SEQUELA: Primary | ICD-10-CM

## 2020-08-17 LAB
ALBUMIN SERPL-MCNC: 3.9 G/DL (ref 3.4–5)
ALP SERPL-CCNC: 54 U/L (ref 40–150)
ALT SERPL W P-5'-P-CCNC: 32 U/L (ref 0–50)
ANION GAP SERPL CALCULATED.3IONS-SCNC: 4 MMOL/L (ref 3–14)
AST SERPL W P-5'-P-CCNC: 14 U/L (ref 0–45)
BASOPHILS # BLD AUTO: 0.1 10E9/L (ref 0–0.2)
BASOPHILS NFR BLD AUTO: 1.4 %
BILIRUB SERPL-MCNC: 0.5 MG/DL (ref 0.2–1.3)
BUN SERPL-MCNC: 11 MG/DL (ref 7–30)
CALCIUM SERPL-MCNC: 9.3 MG/DL (ref 8.5–10.1)
CHLORIDE SERPL-SCNC: 102 MMOL/L (ref 94–109)
CO2 SERPL-SCNC: 31 MMOL/L (ref 20–32)
CREAT SERPL-MCNC: 0.82 MG/DL (ref 0.52–1.04)
DIFFERENTIAL METHOD BLD: NORMAL
EOSINOPHIL # BLD AUTO: 0.1 10E9/L (ref 0–0.7)
EOSINOPHIL NFR BLD AUTO: 0.9 %
ERYTHROCYTE [DISTWIDTH] IN BLOOD BY AUTOMATED COUNT: 11.3 % (ref 10–15)
GFR SERPL CREATININE-BSD FRML MDRD: >90 ML/MIN/{1.73_M2}
GLUCOSE SERPL-MCNC: 96 MG/DL (ref 70–99)
HCT VFR BLD AUTO: 45.9 % (ref 35–47)
HGB BLD-MCNC: 14.9 G/DL (ref 11.7–15.7)
IMM GRANULOCYTES # BLD: 0.4 10E9/L (ref 0–0.4)
IMM GRANULOCYTES NFR BLD: 3.7 %
LYMPHOCYTES # BLD AUTO: 3.9 10E9/L (ref 0.8–5.3)
LYMPHOCYTES NFR BLD AUTO: 38 %
MCH RBC QN AUTO: 31.4 PG (ref 26.5–33)
MCHC RBC AUTO-ENTMCNC: 32.5 G/DL (ref 31.5–36.5)
MCV RBC AUTO: 97 FL (ref 78–100)
MONOCYTES # BLD AUTO: 1 10E9/L (ref 0–1.3)
MONOCYTES NFR BLD AUTO: 9.7 %
NEUTROPHILS # BLD AUTO: 4.8 10E9/L (ref 1.6–8.3)
NEUTROPHILS NFR BLD AUTO: 46.3 %
NRBC # BLD AUTO: 0 10*3/UL
NRBC BLD AUTO-RTO: 0 /100
PLATELET # BLD AUTO: 327 10E9/L (ref 150–450)
POTASSIUM SERPL-SCNC: 3.7 MMOL/L (ref 3.4–5.3)
PROT SERPL-MCNC: 7.5 G/DL (ref 6.8–8.8)
RBC # BLD AUTO: 4.74 10E12/L (ref 3.8–5.2)
SODIUM SERPL-SCNC: 137 MMOL/L (ref 133–144)
WBC # BLD AUTO: 10.3 10E9/L (ref 4–11)

## 2020-08-17 ASSESSMENT — PAIN SCALES - GENERAL: PAINLEVEL: NO PAIN (0)

## 2020-08-17 NOTE — LETTER
8/17/2020       RE: Gabriela Norton  [address] [place] [address]     Dear Colleague,    Thank you for referring your patient, Gabriela Norton, to the [facility] DERMATOLOGY at [facility]. Please see a copy of my visit note below.    [facility] Dermatology Note    Dermatology Problem List:  1. History of morbilliform drug eruption due to Bactrim, resolved  - avoid Bactrim  2. Hx dysplastic nevi  - moderate, L scapula, L thigh s/p ED&C (PCP) 1/15/18  - L anterior thigh, bx 2/6/19    Encounter Date: Aug 17, 2020    CC: hospital follow up    History of Present Illness:  Ms. Gabriela Norton is a 25 year old female presenting for follow up of drug eruption  - dermatology consulted via teledermatology on 8/8/20  - received Bactrim 8/1/20 after cyst excision  - had history of fever, myalgias, headache, COVID-19 testing negative  - full body morbilliform eruption starting 8/7/20  - labs with thrombocytopenia and leukopenia  - treated with methylprednisolone and antihistamines  - overall skin eruption is resolved, denies recent or recurrent fever, chills, abdominal pain, facial edema    Past Medical History:   Patient Active Problem List   Diagnosis     Nausea vomiting and diarrhea     Allergic reaction     Past Medical History:   Diagnosis Date     Asthma      Past Surgical History:   Procedure Laterality Date     BIOPSY OF SKIN LESION       HC TOOTH EXTRACTION W/FORCEP         Social History:  Patient reports that she has never smoked. She has never used smokeless tobacco. She reports current alcohol use. She reports current drug use. Frequency: 1.00 time per week. Drug: Marijuana.    Family History:  Family History   Problem Relation Age of Onset     Skin Cancer Maternal Grandmother      Skin Cancer Paternal Grandmother      Heart Defect Father      Cerebrovascular Disease Father      Melanoma No family hx of        Medications:  Current Outpatient Medications    Medication Sig Dispense Refill     albuterol (PROAIR HFA/PROVENTIL HFA/VENTOLIN HFA) 108 (90 BASE) MCG/ACT Inhaler Inhale 2 puffs into the lungs       escitalopram (LEXAPRO) 20 MG tablet Take 20 mg by mouth daily       hydrOXYzine (ATARAX) 25 MG tablet Take 25 mg by mouth every 6 hours as needed       Levonorgestrel (MIRENA, 52 MG, IU)        ondansetron (ZOFRAN-ODT) 4 MG ODT tab Take 1 tablet (4 mg) by mouth every 6 hours as needed for nausea or vomiting (Patient not taking: Reported on 2/6/2019) 10 tablet 0     predniSONE (DELTASONE) 10 MG tablet Take 4 tabs by mouth daily x 1 day, then 3 tabs daily x 2 days, then 2 tab daily x 2 days, then 1 tab daily x 2 days, 1/2 tab daily x 1 day. (Patient not taking: Reported on 8/17/2020) 16 tablet 0        Allergies:  Allergies   Allergen Reactions     Bactrim [Sulfamethoxazole W/Trimethoprim] Rash     Amoxicillin Rash       Review of Systems:  Constitutional: Otherwise feeling well today, in usual state of health.  HEENT: Patient denies nonhealing oral sores.    Physical exam:  Vitals: There were no vitals taken for this visit.  General: in no acute distress, well-developed, well-nourished  Eyes: conjunctivae clear  Pulmonary: breathing comfortably in no distress  CV: well-perfused, no cyanosis  Extremities: no deformity, no edema  Lymphatic: no lymphadenopathy    Skin: face, bilateral arms, bilateral legs, chest  - skin type: light-skinned  - normal skin exam, no abnormal inflammatory lesions    Impression/Plan:  1. History of morbilliform drug eruption due to Bactrim, resolved  - counseled on delayed type hypersensitivity reaction possibly mediated by transient viral infection  - very low suspicion for severe cutaneous adverse reaction such as DRESS/DIHS  - will check CBC with diff and CMP to ensure resolution of systemic inflammation  - avoid Bactrim in the future    [facility]  [address] [place] [address] on close of this  encounter.    Follow-up 1-2 years for skin exam (history of dysplastic nevi) or sooner for new or changing lesions    Faculty: Dr. Lobo    Staff Involved:  Resident/Staff    Dariela Estrella MD  Dermatology Resident  [facility]    I, Catrina Lobo MD, saw this patient with the resident and agree with the resident s findings and plan of care as documented in the resident s note.    Again, thank you for allowing me to participate in the care of your patient.      Sincerely,    Catrina Lobo MD

## 2020-08-17 NOTE — NURSING NOTE
Dermatology Rooming Note    Gabriela Norton's goals for this visit include:   Chief Complaint   Patient presents with     Derm Problem     HFU, Gabriela states her rash is completely cleared.       Suma Gómez LPN

## 2020-08-17 NOTE — PROGRESS NOTES
[facility] Dermatology Note    Dermatology Problem List:  1. History of morbilliform drug eruption due to Bactrim, resolved  - avoid Bactrim  2. Hx dysplastic nevi  - moderate, L scapula, L thigh s/p ED&C (PCP) 1/15/18  - L anterior thigh, bx 2/6/19    Encounter Date: Aug 17, 2020    CC: hospital follow up    History of Present Illness:  Ms. Gabriela Norton is a 25 year old female presenting for follow up of drug eruption  - dermatology consulted via teledermatology on 8/8/20  - received Bactrim 8/1/20 after cyst excision  - had history of fever, myalgias, headache, COVID-19 testing negative  - full body morbilliform eruption starting 8/7/20  - labs with thrombocytopenia and leukopenia  - treated with methylprednisolone and antihistamines  - overall skin eruption is resolved, denies recent or recurrent fever, chills, abdominal pain, facial edema    Past Medical History:   Patient Active Problem List   Diagnosis     Nausea vomiting and diarrhea     Allergic reaction     Past Medical History:   Diagnosis Date     Asthma      Past Surgical History:   Procedure Laterality Date     BIOPSY OF SKIN LESION       HC TOOTH EXTRACTION W/FORCEP         Social History:  Patient reports that she has never smoked. She has never used smokeless tobacco. She reports current alcohol use. She reports current drug use. Frequency: 1.00 time per week. Drug: Marijuana.    Family History:  Family History   Problem Relation Age of Onset     Skin Cancer Maternal Grandmother      Skin Cancer Paternal Grandmother      Heart Defect Father      Cerebrovascular Disease Father      Melanoma No family hx of        Medications:  Current Outpatient Medications   Medication Sig Dispense Refill     albuterol (PROAIR HFA/PROVENTIL HFA/VENTOLIN HFA) 108 (90 BASE) MCG/ACT Inhaler Inhale 2 puffs into the lungs       escitalopram (LEXAPRO) 20 MG tablet Take 20 mg by mouth daily       hydrOXYzine (ATARAX) 25 MG tablet Take 25 mg by mouth  every 6 hours as needed       Levonorgestrel (MIRENA, 52 MG, IU)        ondansetron (ZOFRAN-ODT) 4 MG ODT tab Take 1 tablet (4 mg) by mouth every 6 hours as needed for nausea or vomiting (Patient not taking: Reported on 2/6/2019) 10 tablet 0     predniSONE (DELTASONE) 10 MG tablet Take 4 tabs by mouth daily x 1 day, then 3 tabs daily x 2 days, then 2 tab daily x 2 days, then 1 tab daily x 2 days, 1/2 tab daily x 1 day. (Patient not taking: Reported on 8/17/2020) 16 tablet 0        Allergies:  Allergies   Allergen Reactions     Bactrim [Sulfamethoxazole W/Trimethoprim] Rash     Amoxicillin Rash       Review of Systems:  Constitutional: Otherwise feeling well today, in usual state of health.  HEENT: Patient denies nonhealing oral sores.    Physical exam:  Vitals: There were no vitals taken for this visit.  General: in no acute distress, well-developed, well-nourished  Eyes: conjunctivae clear  Pulmonary: breathing comfortably in no distress  CV: well-perfused, no cyanosis  Extremities: no deformity, no edema  Lymphatic: no lymphadenopathy    Skin: face, bilateral arms, bilateral legs, chest  - skin type: light-skinned  - normal skin exam, no abnormal inflammatory lesions    Impression/Plan:  1. History of morbilliform drug eruption due to Bactrim, resolved  - counseled on delayed type hypersensitivity reaction possibly mediated by transient viral infection  - very low suspicion for severe cutaneous adverse reaction such as DRESS/DIHS  - will check CBC with diff and CMP to ensure resolution of systemic inflammation  - avoid Bactrim in the future    [facility]  [address] [place] [address] on close of this encounter.    Follow-up 1-2 years for skin exam (history of dysplastic nevi) or sooner for new or changing lesions    Faculty: Dr. Lobo    Staff Involved:  Resident/Staff    Dariela Estrella MD  Dermatology Resident  [facility]    I, Catrina Lobo MD, saw this patient with the  resident and agree with the resident s findings and plan of care as documented in the resident s note.

## 2020-10-29 ENCOUNTER — OFFICE VISIT - HEALTHEAST (OUTPATIENT)
Dept: FAMILY MEDICINE | Facility: CLINIC | Age: 25
End: 2020-10-29

## 2020-10-29 DIAGNOSIS — Z20.822 SUSPECTED 2019 NOVEL CORONAVIRUS INFECTION: ICD-10-CM

## 2020-10-30 ENCOUNTER — RECORDS - HEALTHEAST (OUTPATIENT)
Dept: LAB | Facility: CLINIC | Age: 25
End: 2020-10-30

## 2020-10-30 LAB
SARS-COV-2 PCR COMMENT: NORMAL
SARS-COV-2 RNA SPEC QL NAA+PROBE: NEGATIVE
SARS-COV-2 VIRUS SPECIMEN SOURCE: NORMAL

## 2021-01-03 ENCOUNTER — HEALTH MAINTENANCE LETTER (OUTPATIENT)
Age: 26
End: 2021-01-03

## 2021-03-01 ENCOUNTER — MYC MEDICAL ADVICE (OUTPATIENT)
Dept: FAMILY MEDICINE | Facility: CLINIC | Age: 26
End: 2021-03-01

## 2021-03-01 ENCOUNTER — OFFICE VISIT (OUTPATIENT)
Dept: FAMILY MEDICINE | Facility: CLINIC | Age: 26
End: 2021-03-01
Payer: COMMERCIAL

## 2021-03-01 VITALS
TEMPERATURE: 98.2 F | WEIGHT: 157 LBS | DIASTOLIC BLOOD PRESSURE: 70 MMHG | HEART RATE: 70 BPM | BODY MASS INDEX: 26.95 KG/M2 | SYSTOLIC BLOOD PRESSURE: 100 MMHG | OXYGEN SATURATION: 97 %

## 2021-03-01 DIAGNOSIS — Z11.4 SCREENING FOR HIV (HUMAN IMMUNODEFICIENCY VIRUS): ICD-10-CM

## 2021-03-01 DIAGNOSIS — Z00.00 ROUTINE GENERAL MEDICAL EXAMINATION AT A HEALTH CARE FACILITY: Primary | ICD-10-CM

## 2021-03-01 DIAGNOSIS — Z00.00 ROUTINE GENERAL MEDICAL EXAMINATION AT A HEALTH CARE FACILITY: ICD-10-CM

## 2021-03-01 DIAGNOSIS — Z11.3 ROUTINE SCREENING FOR STI (SEXUALLY TRANSMITTED INFECTION): Primary | ICD-10-CM

## 2021-03-01 DIAGNOSIS — Z11.3 ROUTINE SCREENING FOR STI (SEXUALLY TRANSMITTED INFECTION): ICD-10-CM

## 2021-03-01 DIAGNOSIS — Z11.59 ENCOUNTER FOR HEPATITIS C SCREENING TEST FOR LOW RISK PATIENT: ICD-10-CM

## 2021-03-01 DIAGNOSIS — F41.1 GENERALIZED ANXIETY DISORDER: ICD-10-CM

## 2021-03-01 LAB
HCV AB SERPL QL IA: NONREACTIVE
HIV 1+2 AB+HIV1 P24 AG SERPL QL IA: NONREACTIVE

## 2021-03-01 PROCEDURE — 36415 COLL VENOUS BLD VENIPUNCTURE: CPT | Performed by: FAMILY MEDICINE

## 2021-03-01 PROCEDURE — 87389 HIV-1 AG W/HIV-1&-2 AB AG IA: CPT | Performed by: FAMILY MEDICINE

## 2021-03-01 PROCEDURE — 99385 PREV VISIT NEW AGE 18-39: CPT | Performed by: FAMILY MEDICINE

## 2021-03-01 PROCEDURE — 86803 HEPATITIS C AB TEST: CPT | Performed by: FAMILY MEDICINE

## 2021-03-01 PROCEDURE — 99213 OFFICE O/P EST LOW 20 MIN: CPT | Mod: 25 | Performed by: FAMILY MEDICINE

## 2021-03-01 RX ORDER — ESCITALOPRAM OXALATE 20 MG/1
20 TABLET ORAL DAILY
Qty: 30 TABLET | Refills: 5 | Status: SHIPPED | OUTPATIENT
Start: 2021-03-01

## 2021-03-01 NOTE — PROGRESS NOTES
SUBJECTIVE:   CC: Gabriela Norton is an 26 year old woman who presents for preventive health visit.       Patient has been advised of split billing requirements and indicates understanding: Yes     Healthy Habits:    Do you get at least three servings of calcium containing foods daily (dairy, green leafy vegetables, etc.)? yes    Amount of exercise or daily activities, outside of work: 1 day     Problems taking medications regularly No    Medication side effects: No    Have you had an eye exam in the past two years? yes    Do you see a dentist twice per year? yes    Do you have sleep apnea, excessive snoring or daytime drowsiness?no    History of anxiety.  History of trauma.  Was seeing a psychiatrist for her medications.  Overall has been stable on Lexapro 20mg daily with hydroxyzine 25mg as needed.    Has Mirena, due to be replaced in October.      History of exercise induced asthma - no albuterol use in years.    Today's PHQ-2 Score:   PHQ-2 ( 1999 Pfizer) 3/1/2021 2/4/2020   Q1: Little interest or pleasure in doing things 0 0   Q2: Feeling down, depressed or hopeless 0 1   PHQ-2 Score 0 1       Abuse: Current or Past(Physical, Sexual or Emotional)- No  Do you feel safe in your environment? Yes    Have you ever done Advance Care Planning? (For example, a Health Directive, POLST, or a discussion with a medical provider or your loved ones about your wishes): No, advance care planning information given to patient to review.  Patient plans to discuss their wishes with loved ones or provider.      Social History     Tobacco Use     Smoking status: Never Smoker     Smokeless tobacco: Never Used   Substance Use Topics     Alcohol use: Yes     Comment: occasional     If you drink alcohol do you typically have >3 drinks per day or >7 drinks per week? No                     Reviewed orders with patient.  Reviewed health maintenance and updated orders accordingly - Yes  Patient Active Problem List   Diagnosis     Nausea  vomiting and diarrhea     Allergic reaction     Past Surgical History:   Procedure Laterality Date     BIOPSY OF SKIN LESION       HC TOOTH EXTRACTION W/FORCEP         Social History     Tobacco Use     Smoking status: Never Smoker     Smokeless tobacco: Never Used   Substance Use Topics     Alcohol use: Yes     Comment: occasional     Family History   Problem Relation Age of Onset     Skin Cancer Maternal Grandmother      Skin Cancer Paternal Grandmother      Gallbladder Disease Mother      Heart Defect Father      Cerebrovascular Disease Father      Ear Disorder Sister      Ear Disorder Brother      Depression Brother      Melanoma No family hx of      Breast Cancer No family hx of      Ovarian Cancer No family hx of      Colon Cancer No family hx of            Patient under 40 years of age: Routine Mammogram Screening not recommended.   Pertinent mammograms are reviewed under the imaging tab.    Pertinent mammograms are reviewed under the imaging tab.  History of abnormal Pap smear: NO - age 21-29 PAP every 3 years recommended     Reviewed and updated as needed this visit by clinical staff  Tobacco  Allergies  Meds   Med Hx  Surg Hx  Fam Hx  Soc Hx        Reviewed and updated as needed this visit by Provider                    ROS:  CONSTITUTIONAL: NEGATIVE for fever, chills, change in weight  INTEGUMENTARU/SKIN: NEGATIVE for worrisome rashes, moles or lesions  EYES: NEGATIVE for vision changes or irritation  ENT: NEGATIVE for ear, mouth and throat problems  RESP: NEGATIVE for significant cough or SOB  BREAST: NEGATIVE for masses, tenderness or discharge  CV: NEGATIVE for chest pain, palpitations or peripheral edema  GI: NEGATIVE for nausea, abdominal pain, heartburn, or change in bowel habits  : NEGATIVE for unusual urinary or vaginal symptoms. Periods are regular.  MUSCULOSKELETAL: NEGATIVE for significant arthralgias or myalgia  NEURO: NEGATIVE for weakness, dizziness or paresthesias  ENDOCRINE:  NEGATIVE for temperature intolerance, skin/hair changes  HEME/ALLERGY/IMMUNE: NEGATIVE for bleeding problems  PSYCHIATRIC: NEGATIVE for changes in mood or affect    OBJECTIVE:   /70   Pulse 70   Temp 98.2  F (36.8  C) (Temporal)   Wt 71.2 kg (157 lb)   LMP  (LMP Unknown)   SpO2 97%   Breastfeeding No   BMI 26.95 kg/m    EXAM:  GENERAL: healthy, alert and no distress  EYES: Eyes grossly normal to inspection, PERRL and conjunctivae and sclerae normal  HENT: ear canals and TM's normal, nose and mouth without ulcers or lesions  NECK: no adenopathy, no asymmetry, masses, or scars and thyroid normal to palpation  RESP: lungs clear to auscultation - no rales, rhonchi or wheezes  CV: regular rate and rhythm, normal S1 S2, no S3 or S4, no murmur, click or rub, no peripheral edema and peripheral pulses strong  ABDOMEN: soft, nontender, no hepatosplenomegaly, no masses and bowel sounds normal  MS: no gross musculoskeletal defects noted, no edema  SKIN: no suspicious lesions or rashes  NEURO: Normal strength and tone, mentation intact and speech normal  PSYCH: mentation appears normal, affect normal/bright    ASSESSMENT/PLAN:     1. Routine general medical examination at a health care facility  2. Routine screening for STI (sexually transmitted infection)  3. Screening for HIV (human immunodeficiency virus)  4. Encounter for hepatitis C screening test for low risk patient  Labs as ordered.  Explained recommended screenings and she is agreeable.  I was able to pull in pap results from 1 year ago - normal, plan to repeat in 2023.    - Hepatitis C antibody; Future  - HIV Antigen Antibody Combo; Future  - NEISSERIA GONORRHOEA PCR; Future  - CHLAMYDIA TRACHOMATIS PCR; Future    5. Generalized anxiety disorder  Stable.  Advised her that I am very comfortable managing her anxiety medications.  At any time if she or I decide that a new psychiatrist is needed we can pursue that consultation.  For now she will continue  "medications without change.  No safety concerns.    - escitalopram (LEXAPRO) 20 MG tablet; Take 1 tablet (20 mg) by mouth daily  Dispense: 30 tablet; Refill: 5    COUNSELING:   Reviewed preventive health counseling, as reflected in patient instructions    Estimated body mass index is 26.95 kg/m  as calculated from the following:    Height as of 8/7/20: 1.626 m (5' 4\").    Weight as of this encounter: 71.2 kg (157 lb).    Weight management plan: Discussed healthy diet and exercise guidelines - see AVS    She reports that she has never smoked. She has never used smokeless tobacco.    Counseling Resources:  ATP IV Guidelines  Pooled Cohorts Equation Calculator  Breast Cancer Risk Calculator  BRCA-Related Cancer Risk Assessment: FHS-7 Tool  FRAX Risk Assessment  ICSI Preventive Guidelines  Dietary Guidelines for Americans, 2010  USDA's MyPlate  ASA Prophylaxis  Lung CA Screening    Dixie Moore [facility]  "

## 2021-03-01 NOTE — TELEPHONE ENCOUNTER
Routing to provider Jonnathan Moore - please review and advise as appropriate    See patient message about labs - is the order what you wanted - cervical?

## 2021-06-01 VITALS — WEIGHT: 122.2 LBS | BODY MASS INDEX: 20.86 KG/M2 | HEIGHT: 64 IN

## 2021-06-18 NOTE — PROGRESS NOTES
Provider wore a mask during this visit.   Subjective:   Gabriela Mcmahon is a 23 y.o. female  Roomed by: Renee Snyder    Refills needed? No    Do you have any forms that need to be filled out? No      Chief Complaint   Patient presents with     sinus pressure     June 1st it started with sinus pressure and a fever. Finished Azithromycin on June 8th.      Cough     Started June 4th with chest pain when she started taking Azithromycin May 25th.    Says her started getting ill on 5/28 with sinus pressure and fever. She received her antibiotics on 6/1 at [facility] the [facility]. Coughing also started on 5/28. Says coughing has kept her up at night. Admits chest pain with and without coughing since 6/1. Last took azithromycin and Mucinex D on 6/6. Denies any shortness of breath. Says that she is here because of her persistent coughing and chest pains. Admits chest feels tight with deep breathing. Energy level has been low since 5/28. Admits that patient has been eating, drinking and urinating normally.  Denies any recent belly pain, or vomiting, but had some nausea and diarrhea the 1st days of taking the antibiotics. Admits daily headaches since 5/28 and a little dizziness. Says she missed one day at work, but has not missed any classes. She just started school on 6/11. Says she is scared about the chest pain. Says it is mainly on her side and has been continuous since 6/1 when she started taking the antibiotic. Has been using an albuterol inhaler for exercise since 2012. Says her paternal grandfather had asthma.   In the last 3 days, denies fevers or chills.   Review of Systems  See HPI for ROS, otherwise all other systems negative    Allergies   Allergen Reactions     Amoxicillin Rash       Current Outpatient Prescriptions:      LEVONORGESTREL IMPL, by Intrauterine route., Disp: , Rfl:      albuterol (PROAIR HFA;PROVENTIL HFA;VENTOLIN HFA) 90 mcg/actuation inhaler, Inhale 2 puffs as needed., Disp: , Rfl:       "AZITHROMYCIN ORAL, Take by mouth., Disp: , Rfl:   There is no problem list on file for this patient.    Medical History Reviewed  Objective:     Vitals:    06/14/18 1645   BP: 100/60   Patient Site: Right Arm   Patient Position: Sitting   Cuff Size: Adult Regular   Pulse: 88   Resp: 16   Temp: 98.5  F (36.9  C)   TempSrc: Oral   SpO2: 99%   Weight: 122 lb 3.2 oz (55.4 kg)   Height: 5' 4\" (1.626 m)   Gen - Pt in NAD  Eyes - Conjunctiva non injected, no drainage  Face - non TTP over frontal sinus areas; non TTP over maxillary areas  Ears - external canals - no induration, Right TM - not injected, Left TM - not injected   Nose - not congested, no nasal drainage  Pharynx - not injected, tonsils 1+ size  Neck - supple, no cervical adenopathy, no masses  Cor - RRR w/o murmur  Chest wall - non TTP  Lungs - Good air entry; no wheezes or crackles noted on auscultation - no coughing noted  Skin - no lesions, no rashes noted    Xr Chest 2 Views    Result Date: 6/14/2018  EXAM DATE:         06/14/2018 [facility] X-RAY CHEST, 2 VIEWS, FRONTAL AND LATERAL 6/14/2018 6:00 PM INDICATION: Persistent right sided chest pain since 6/1 - [place] COMPARISON: None. FINDINGS: Negative chest.   Radiologist's report discussed with patient on day of visit.     Assessment - Plan   Medical Decision Making -23-year-old woman presents with some chest discomfort since starting azithromycin on 6/1 and finishing on 6/6.  She admits to history of exercise-induced asthma.  Last used her inhaler approximately 3 weeks ago.  The patient's exam was negative, ordered a chest x-ray to confirm no evidence for pneumothorax.  Phoned [facility] pharmacy and spoke with the pharmacist ,Hermes, about the incidence of azithromycin being the cause of chest pain as a side effect.  His sources did say that there is a less than 1% chance of chest pain associated with azithromycin.  Since patient has a history of exercise-induced asthma we will treat this " as an exacerbation of reactive airways with 5 days of prednisone and have developed patient follow-up at [facility] on 6/18.  See patient instructions.     1. Exacerbation of intermittent asthma  - predniSONE (DELTASONE) 20 MG tablet; Take 2 tablets (40 mg total) by mouth daily for 5 days.  Dispense: 10 tablet; Refill: 0  - Nursing communication    2. Chest pain of unknown etiology  - albuterol nebulizer solution 2.5 mg (PROVENTIL); Take 3 mL (2.5 mg total) by nebulization once.  - XR Chest 2 Views; Future  - Nursing communication  - XR Chest 2 Views    3. Cough    At the conclusion of the encounter, assessment and plan were discussed.   All questions were answered.   The patient or guardian acknowledged understanding and was involved in the decision making regarding the overall care plan.    Patient Instructions   1. Keep well hydrated  2. May alternate Tylenol every 6 hours with ibuprofen every 6 hours as needed for fever or pain  3. Follow up at [facility] on 6/18  4. If you have any questions, call the clinic number  - it's answered 24/7

## 2021-10-10 ENCOUNTER — HEALTH MAINTENANCE LETTER (OUTPATIENT)
Age: 26
End: 2021-10-10

## 2022-02-25 LAB — CYTOLOGY CVX/VAG DOC THIN PREP: NORMAL

## 2022-05-21 ENCOUNTER — HEALTH MAINTENANCE LETTER (OUTPATIENT)
Age: 27
End: 2022-05-21

## 2022-09-17 ENCOUNTER — HEALTH MAINTENANCE LETTER (OUTPATIENT)
Age: 27
End: 2022-09-17

## 2023-02-20 ENCOUNTER — HOSPITAL ENCOUNTER (OUTPATIENT)
Dept: REHABILITATION | Age: 28
Discharge: STILL ADMITTED OR ADMINISTRATIVE | End: 2023-02-20
Attending: PHYSICAL THERAPIST | Admitting: PHYSICAL THERAPIST

## 2023-02-20 PROCEDURE — 10004512: Performed by: PHYSICAL THERAPIST

## 2023-06-04 ENCOUNTER — HEALTH MAINTENANCE LETTER (OUTPATIENT)
Age: 28
End: 2023-06-04

## 2023-11-02 ENCOUNTER — APPOINTMENT (OUTPATIENT)
Dept: LAB | Age: 28
End: 2023-11-02

## 2024-01-12 ENCOUNTER — TELEPHONE (OUTPATIENT)
Dept: FAMILY MEDICINE | Age: 29
End: 2024-01-12

## 2024-01-17 RX ORDER — ESCITALOPRAM OXALATE 20 MG/1
20 TABLET ORAL DAILY
COMMUNITY
End: 2024-01-17 | Stop reason: SDUPTHER

## 2024-01-17 RX ORDER — ESCITALOPRAM OXALATE 20 MG/1
20 TABLET ORAL DAILY
Qty: 90 TABLET | Refills: 0 | Status: SHIPPED | OUTPATIENT
Start: 2024-01-17

## 2024-04-15 RX ORDER — ESCITALOPRAM OXALATE 20 MG/1
20 TABLET ORAL DAILY
Qty: 90 TABLET | Refills: 0 | Status: SHIPPED | OUTPATIENT
Start: 2024-04-15

## 2024-04-23 ENCOUNTER — APPOINTMENT (OUTPATIENT)
Dept: FAMILY MEDICINE | Age: 29
End: 2024-04-23

## 2024-04-23 VITALS
SYSTOLIC BLOOD PRESSURE: 108 MMHG | HEIGHT: 64 IN | DIASTOLIC BLOOD PRESSURE: 72 MMHG | RESPIRATION RATE: 16 BRPM | HEART RATE: 62 BPM | WEIGHT: 130 LBS | OXYGEN SATURATION: 98 % | BODY MASS INDEX: 22.2 KG/M2

## 2024-04-23 DIAGNOSIS — Z00.00 ENCOUNTER FOR ANNUAL HEALTH EXAMINATION: ICD-10-CM

## 2024-04-23 DIAGNOSIS — Z13.220 LIPID SCREENING: ICD-10-CM

## 2024-04-23 DIAGNOSIS — F41.1 GENERALIZED ANXIETY DISORDER: ICD-10-CM

## 2024-04-23 DIAGNOSIS — Z76.89 ENCOUNTER TO ESTABLISH CARE WITH NEW DOCTOR: Primary | ICD-10-CM

## 2024-04-23 DIAGNOSIS — Z97.5 PRESENCE OF IUD: ICD-10-CM

## 2024-04-23 DIAGNOSIS — F32.A DEPRESSION, UNSPECIFIED DEPRESSION TYPE: ICD-10-CM

## 2024-04-23 DIAGNOSIS — Z23 NEED FOR VACCINATION: ICD-10-CM

## 2024-04-23 PROBLEM — F33.41 MAJOR DEPRESSIVE DISORDER, RECURRENT EPISODE, IN PARTIAL REMISSION (CMD): Status: ACTIVE | Noted: 2019-02-22

## 2024-04-23 PROBLEM — N83.8 ENLARGED OVARY: Status: ACTIVE | Noted: 2022-09-28

## 2024-04-23 PROBLEM — D23.9 DYSPLASTIC NEVI: Status: ACTIVE | Noted: 2022-09-27

## 2024-04-23 PROCEDURE — 99385 PREV VISIT NEW AGE 18-39: CPT | Performed by: STUDENT IN AN ORGANIZED HEALTH CARE EDUCATION/TRAINING PROGRAM

## 2024-04-23 PROCEDURE — 90480 ADMN SARSCOV2 VAC 1/ONLY CMP: CPT | Performed by: STUDENT IN AN ORGANIZED HEALTH CARE EDUCATION/TRAINING PROGRAM

## 2024-04-23 PROCEDURE — 91322 SARSCOV2 VAC 50 MCG/0.5ML IM: CPT | Performed by: STUDENT IN AN ORGANIZED HEALTH CARE EDUCATION/TRAINING PROGRAM

## 2024-04-23 ASSESSMENT — PATIENT HEALTH QUESTIONNAIRE - PHQ9
2. FEELING DOWN, DEPRESSED OR HOPELESS: NOT AT ALL
1. LITTLE INTEREST OR PLEASURE IN DOING THINGS: NOT AT ALL
SUM OF ALL RESPONSES TO PHQ9 QUESTIONS 1 AND 2: 0
SUM OF ALL RESPONSES TO PHQ9 QUESTIONS 1 AND 2: 0
CLINICAL INTERPRETATION OF PHQ2 SCORE: NO FURTHER SCREENING NEEDED

## 2024-04-24 ENCOUNTER — LAB SERVICES (OUTPATIENT)
Dept: LAB | Age: 29
End: 2024-04-24

## 2024-04-24 DIAGNOSIS — Z00.00 ENCOUNTER FOR ANNUAL HEALTH EXAMINATION: ICD-10-CM

## 2024-04-24 DIAGNOSIS — Z13.220 LIPID SCREENING: ICD-10-CM

## 2024-04-24 LAB
ALBUMIN SERPL-MCNC: 4.3 G/DL (ref 3.6–5.1)
ALBUMIN/GLOB SERPL: 1.3 {RATIO} (ref 1–2.4)
ALP SERPL-CCNC: 52 UNITS/L (ref 45–117)
ALT SERPL-CCNC: 20 UNITS/L
ANION GAP SERPL CALC-SCNC: 13 MMOL/L (ref 7–19)
AST SERPL-CCNC: 17 UNITS/L
BASOPHILS # BLD: 0 K/MCL (ref 0–0.3)
BASOPHILS NFR BLD: 1 %
BILIRUB SERPL-MCNC: 0.9 MG/DL (ref 0.2–1)
BUN SERPL-MCNC: 13 MG/DL (ref 6–20)
BUN/CREAT SERPL: 18 (ref 7–25)
CALCIUM SERPL-MCNC: 9 MG/DL (ref 8.4–10.2)
CHLORIDE SERPL-SCNC: 100 MMOL/L (ref 97–110)
CO2 SERPL-SCNC: 28 MMOL/L (ref 21–32)
CREAT SERPL-MCNC: 0.73 MG/DL (ref 0.51–0.95)
DEPRECATED RDW RBC: 41.4 FL (ref 39–50)
EGFRCR SERPLBLD CKD-EPI 2021: >90 ML/MIN/{1.73_M2}
EOSINOPHIL # BLD: 0.1 K/MCL (ref 0–0.5)
EOSINOPHIL NFR BLD: 1 %
ERYTHROCYTE [DISTWIDTH] IN BLOOD: 11.5 % (ref 11–15)
FASTING DURATION TIME PATIENT: NORMAL H
GLOBULIN SER-MCNC: 3.2 G/DL (ref 2–4)
GLUCOSE SERPL-MCNC: 81 MG/DL (ref 70–99)
HCT VFR BLD CALC: 41.6 % (ref 36–46.5)
HGB BLD-MCNC: 13.6 G/DL (ref 12–15.5)
IMM GRANULOCYTES # BLD AUTO: 0 K/MCL (ref 0–0.2)
IMM GRANULOCYTES # BLD: 0 %
LYMPHOCYTES # BLD: 1.1 K/MCL (ref 1–4.8)
LYMPHOCYTES NFR BLD: 19 %
MCH RBC QN AUTO: 31.1 PG (ref 26–34)
MCHC RBC AUTO-ENTMCNC: 32.7 G/DL (ref 32–36.5)
MCV RBC AUTO: 95.2 FL (ref 78–100)
MONOCYTES # BLD: 0.3 K/MCL (ref 0.3–0.9)
MONOCYTES NFR BLD: 6 %
NEUTROPHILS # BLD: 3.9 K/MCL (ref 1.8–7.7)
NEUTROPHILS NFR BLD: 73 %
PLATELET # BLD AUTO: 190 K/MCL (ref 140–450)
POTASSIUM SERPL-SCNC: 4.5 MMOL/L (ref 3.4–5.1)
PROT SERPL-MCNC: 7.5 G/DL (ref 6.4–8.2)
RBC # BLD: 4.37 MIL/MCL (ref 4–5.2)
SODIUM SERPL-SCNC: 136 MMOL/L (ref 135–145)
WBC # BLD: 5.5 K/MCL (ref 4.2–11)

## 2024-04-24 PROCEDURE — 85025 COMPLETE CBC W/AUTO DIFF WBC: CPT | Performed by: INTERNAL MEDICINE

## 2024-04-24 PROCEDURE — 80053 COMPREHEN METABOLIC PANEL: CPT | Performed by: INTERNAL MEDICINE

## 2024-04-24 PROCEDURE — 80061 LIPID PANEL: CPT | Performed by: CLINICAL MEDICAL LABORATORY

## 2024-04-24 PROCEDURE — 36415 COLL VENOUS BLD VENIPUNCTURE: CPT | Performed by: INTERNAL MEDICINE

## 2024-04-25 LAB
CHOLEST SERPL-MCNC: 167 MG/DL
CHOLEST/HDLC SERPL: 2 {RATIO}
HDLC SERPL-MCNC: 85 MG/DL
LDLC SERPL CALC-MCNC: 74 MG/DL
NONHDLC SERPL-MCNC: 82 MG/DL
TRIGL SERPL-MCNC: 41 MG/DL

## 2024-07-22 RX ORDER — ESCITALOPRAM OXALATE 20 MG/1
20 TABLET ORAL DAILY
Qty: 90 TABLET | Refills: 0 | Status: SHIPPED | OUTPATIENT
Start: 2024-07-22 | End: 2024-07-23 | Stop reason: SDUPTHER

## 2024-07-23 RX ORDER — ESCITALOPRAM OXALATE 20 MG/1
20 TABLET ORAL DAILY
Qty: 90 TABLET | Refills: 0 | Status: SHIPPED | OUTPATIENT
Start: 2024-07-23

## 2024-10-04 ENCOUNTER — TELEPHONE (OUTPATIENT)
Dept: FAMILY MEDICINE | Age: 29
End: 2024-10-04

## 2024-10-21 RX ORDER — ESCITALOPRAM OXALATE 20 MG/1
20 TABLET ORAL DAILY
Qty: 90 TABLET | Refills: 1 | Status: SHIPPED | OUTPATIENT
Start: 2024-10-21

## 2024-10-23 ENCOUNTER — APPOINTMENT (OUTPATIENT)
Dept: FAMILY MEDICINE | Age: 29
End: 2024-10-23

## 2024-10-30 SDOH — ECONOMIC STABILITY: FOOD INSECURITY: WITHIN THE PAST 12 MONTHS, THE FOOD YOU BOUGHT JUST DIDN'T LAST AND YOU DIDN'T HAVE MONEY TO GET MORE.: NEVER TRUE

## 2024-10-30 SDOH — ECONOMIC STABILITY: HOUSING INSECURITY: WHAT IS YOUR LIVING SITUATION TODAY?: I HAVE A STEADY PLACE TO LIVE

## 2024-10-30 SDOH — ECONOMIC STABILITY: GENERAL: WOULD YOU LIKE HELP WITH ANY OF THE FOLLOWING NEEDS?: I DON'T WANT HELP WITH ANY OF THESE

## 2024-10-30 SDOH — ECONOMIC STABILITY: TRANSPORTATION INSECURITY
IN THE PAST 12 MONTHS, HAS LACK OF RELIABLE TRANSPORTATION KEPT YOU FROM MEDICAL APPOINTMENTS, MEETINGS, WORK OR FROM GETTING THINGS NEEDED FOR DAILY LIVING?: NO

## 2024-10-30 SDOH — ECONOMIC STABILITY: HOUSING INSECURITY: DO YOU HAVE PROBLEMS WITH ANY OF THE FOLLOWING?: NONE OF THE ABOVE

## 2024-10-30 ASSESSMENT — SOCIAL DETERMINANTS OF HEALTH (SDOH): IN THE PAST 12 MONTHS, HAS THE ELECTRIC, GAS, OIL, OR WATER COMPANY THREATENED TO SHUT OFF SERVICE IN YOUR HOME?: NO

## 2024-11-01 ENCOUNTER — APPOINTMENT (OUTPATIENT)
Dept: FAMILY MEDICINE | Age: 29
End: 2024-11-01

## 2024-11-01 VITALS
TEMPERATURE: 97.1 F | HEART RATE: 64 BPM | DIASTOLIC BLOOD PRESSURE: 76 MMHG | OXYGEN SATURATION: 99 % | BODY MASS INDEX: 22.24 KG/M2 | WEIGHT: 130.3 LBS | HEIGHT: 64 IN | SYSTOLIC BLOOD PRESSURE: 118 MMHG

## 2024-11-01 DIAGNOSIS — Z23 NEED FOR VACCINATION: ICD-10-CM

## 2024-11-01 DIAGNOSIS — Z30.433 ENCOUNTER FOR IUD REMOVAL AND REINSERTION: Primary | ICD-10-CM

## 2024-11-01 DIAGNOSIS — Z97.5 IUD (INTRAUTERINE DEVICE) IN PLACE: ICD-10-CM

## 2024-11-01 PROCEDURE — 58301 REMOVE INTRAUTERINE DEVICE: CPT | Performed by: STUDENT IN AN ORGANIZED HEALTH CARE EDUCATION/TRAINING PROGRAM

## 2024-11-01 PROCEDURE — 91322 SARSCOV2 VAC 50 MCG/0.5ML IM: CPT | Performed by: STUDENT IN AN ORGANIZED HEALTH CARE EDUCATION/TRAINING PROGRAM

## 2024-11-01 PROCEDURE — 90480 ADMN SARSCOV2 VAC 1/ONLY CMP: CPT | Performed by: STUDENT IN AN ORGANIZED HEALTH CARE EDUCATION/TRAINING PROGRAM

## 2024-11-01 PROCEDURE — 58300 INSERT INTRAUTERINE DEVICE: CPT | Performed by: STUDENT IN AN ORGANIZED HEALTH CARE EDUCATION/TRAINING PROGRAM

## 2024-11-01 ASSESSMENT — PATIENT HEALTH QUESTIONNAIRE - PHQ9
SUM OF ALL RESPONSES TO PHQ9 QUESTIONS 1 AND 2: 0
1. LITTLE INTEREST OR PLEASURE IN DOING THINGS: NOT AT ALL
CLINICAL INTERPRETATION OF PHQ2 SCORE: NO FURTHER SCREENING NEEDED
SUM OF ALL RESPONSES TO PHQ9 QUESTIONS 1 AND 2: 0
2. FEELING DOWN, DEPRESSED OR HOPELESS: NOT AT ALL

## 2024-12-02 ENCOUNTER — E-ADVICE (OUTPATIENT)
Dept: FAMILY MEDICINE | Age: 29
End: 2024-12-02

## 2024-12-02 ENCOUNTER — TELEPHONE (OUTPATIENT)
Dept: FAMILY MEDICINE | Age: 29
End: 2024-12-02

## 2024-12-10 ENCOUNTER — APPOINTMENT (OUTPATIENT)
Dept: FAMILY MEDICINE | Age: 29
End: 2024-12-10

## 2024-12-10 VITALS
HEART RATE: 55 BPM | RESPIRATION RATE: 16 BRPM | OXYGEN SATURATION: 98 % | SYSTOLIC BLOOD PRESSURE: 110 MMHG | HEIGHT: 64 IN | TEMPERATURE: 98 F | BODY MASS INDEX: 23.05 KG/M2 | WEIGHT: 135 LBS | DIASTOLIC BLOOD PRESSURE: 73 MMHG

## 2024-12-10 DIAGNOSIS — T83.32XA INTRAUTERINE CONTRACEPTIVE DEVICE THREADS LOST, INITIAL ENCOUNTER: ICD-10-CM

## 2024-12-10 DIAGNOSIS — Z97.5 IUD (INTRAUTERINE DEVICE) IN PLACE: ICD-10-CM

## 2024-12-10 DIAGNOSIS — Z12.4 PAP SMEAR FOR CERVICAL CANCER SCREENING: Primary | ICD-10-CM

## 2024-12-10 PROCEDURE — 87591 N.GONORRHOEAE DNA AMP PROB: CPT | Performed by: CLINICAL MEDICAL LABORATORY

## 2024-12-10 PROCEDURE — G0101 CA SCREEN;PELVIC/BREAST EXAM: HCPCS | Performed by: NURSE PRACTITIONER

## 2024-12-10 PROCEDURE — 87661 TRICHOMONAS VAGINALIS AMPLIF: CPT | Performed by: CLINICAL MEDICAL LABORATORY

## 2024-12-10 PROCEDURE — 99213 OFFICE O/P EST LOW 20 MIN: CPT | Performed by: NURSE PRACTITIONER

## 2024-12-10 PROCEDURE — 87491 CHLMYD TRACH DNA AMP PROBE: CPT | Performed by: CLINICAL MEDICAL LABORATORY

## 2024-12-11 LAB
C TRACH RRNA CVX QL NAA+PROBE: NEGATIVE
Lab: NORMAL
Lab: NORMAL
N GONORRHOEA RRNA CVX QL NAA+PROBE: NEGATIVE
T VAGINALIS RRNA CVX QL NAA+PROBE: NEGATIVE

## 2024-12-12 ENCOUNTER — HOSPITAL ENCOUNTER (OUTPATIENT)
Dept: OTHER | Age: 29
Discharge: HOME | End: 2024-12-12
Attending: FAMILY MEDICINE

## 2024-12-12 ENCOUNTER — APPOINTMENT (OUTPATIENT)
Dept: FAMILY MEDICINE | Age: 29
End: 2024-12-12

## 2024-12-12 DIAGNOSIS — Z97.5 IUD (INTRAUTERINE DEVICE) IN PLACE: ICD-10-CM

## 2024-12-12 DIAGNOSIS — T83.32XA INTRAUTERINE CONTRACEPTIVE DEVICE THREADS LOST, INITIAL ENCOUNTER: ICD-10-CM

## 2024-12-12 PROCEDURE — 76830 TRANSVAGINAL US NON-OB: CPT

## 2024-12-17 LAB
CASE RPRT: NORMAL
CYTOLOGY CVX/VAG STUDY: NORMAL
CYTOLOGY CVX/VAG STUDY: NORMAL
HPV16+18+45 E6+E7MRNA CVX NAA+PROBE: NEGATIVE
Lab: NORMAL
PAP EDUCATIONAL NOTE: NORMAL
PATH REPORT.ADDENDUM SPEC: NORMAL
SPECIMEN ADEQUACY: NORMAL

## 2024-12-17 RX ORDER — METRONIDAZOLE 500 MG/1
500 TABLET ORAL EVERY 12 HOURS SCHEDULED
Qty: 14 TABLET | Refills: 0 | Status: SHIPPED | OUTPATIENT
Start: 2024-12-17

## 2024-12-18 ENCOUNTER — E-ADVICE (OUTPATIENT)
Dept: FAMILY MEDICINE | Age: 29
End: 2024-12-18

## 2025-01-02 ENCOUNTER — TELEPHONE (OUTPATIENT)
Dept: INTERNAL MEDICINE | Age: 30
End: 2025-01-02

## 2025-02-11 RX ORDER — ESCITALOPRAM OXALATE 20 MG/1
20 TABLET ORAL DAILY
Qty: 90 TABLET | Refills: 1 | Status: SHIPPED | OUTPATIENT
Start: 2025-02-11

## 2025-05-13 RX ORDER — ESCITALOPRAM OXALATE 20 MG/1
20 TABLET ORAL DAILY
Qty: 90 TABLET | Refills: 1 | Status: SHIPPED | OUTPATIENT
Start: 2025-05-13

## 2025-09-16 ENCOUNTER — APPOINTMENT (OUTPATIENT)
Dept: FAMILY MEDICINE | Age: 30
End: 2025-09-16